# Patient Record
Sex: FEMALE | URBAN - METROPOLITAN AREA
[De-identification: names, ages, dates, MRNs, and addresses within clinical notes are randomized per-mention and may not be internally consistent; named-entity substitution may affect disease eponyms.]

---

## 2017-06-10 ENCOUNTER — TRANSFERRED RECORDS (OUTPATIENT)
Dept: HEALTH INFORMATION MANAGEMENT | Facility: CLINIC | Age: 32
End: 2017-06-10

## 2017-08-22 ENCOUNTER — CARE COORDINATION (OUTPATIENT)
Dept: OTOLARYNGOLOGY | Facility: CLINIC | Age: 32
End: 2017-08-22

## 2017-08-22 NOTE — PROGRESS NOTES
The following message was received from the call center.  Nursing response is also listed below.        ----- Message -----     From: JuniorLeila     Sent: 8/22/2017   8:47 AM       To: Ent Triage-  Subject: Future Ramos pt-pt requesting advice for sym*    Good morning team,    Pt calling in to schedule for sinus pain, major congestion, and allergies. She requested to see someone in ENT. I scheduled her with Ramos, who was in network for her, on 10/11. Pt states she is stressed due to her symptoms, and has not been able to sleep. She requests that someone f/u with her, she is wanting to get advice on her situation. Please contact pt when available.    Thank you!    Leila    Please DO NOT send this message and/or reply back to sender. Call Center Representatives DO NOT respond to messages.    Hi Leila,    We have discussed this issue in the past. As a general rule of thumb, we are not calling out to patients that haven't been seen here to give advice.  This patient in particular does not have anything established in the system (meds/history etc).  It wouldn't be a ham decision to advise.  She should follow up with Atiya in the meantime if symptomatic.    Thanks,  Fatuma Montgomery R.N., B.S.N.  Nurse Coordinator Head & Neck Surgery  [phone]  8/22/2017 11:18 AM

## 2017-08-24 ENCOUNTER — OFFICE VISIT (OUTPATIENT)
Dept: INTERNAL MEDICINE | Facility: CLINIC | Age: 32
End: 2017-08-24

## 2017-08-24 VITALS
HEART RATE: 66 BPM | BODY MASS INDEX: 19.54 KG/M2 | WEIGHT: 110.3 LBS | DIASTOLIC BLOOD PRESSURE: 59 MMHG | TEMPERATURE: 97.6 F | HEIGHT: 63 IN | OXYGEN SATURATION: 100 % | SYSTOLIC BLOOD PRESSURE: 95 MMHG

## 2017-08-24 DIAGNOSIS — J01.01 ACUTE RECURRENT MAXILLARY SINUSITIS: Primary | ICD-10-CM

## 2017-08-24 RX ORDER — LORATADINE 10 MG/1
10 CAPSULE, LIQUID FILLED ORAL DAILY
Qty: 30 CAPSULE | Refills: 3 | Status: ON HOLD | OUTPATIENT
Start: 2017-08-24 | End: 2018-04-14

## 2017-08-24 ASSESSMENT — PAIN SCALES - GENERAL: PAINLEVEL: SEVERE PAIN (7)

## 2017-08-24 NOTE — PROGRESS NOTES
PRIMARY CARE CENTER         HPI:       Cathie Noonan is a 32 year old female who presents for the following   Patient presents with: URI (Patient here for ongoing cold for about 4 weeks.)  Patient has a chronic history involving chronic sinusitis and allergic rhinitis since the age of 19. Per patient her current episode with sinusitis started on July the 29th. Presentation started with clear runny nose, sore throat, sinus congestion. Patient started taking azithromycin which she had from [place]. After finishing the 5 days course of azithromycin her symptoms improved then relapsed again. Patient was seen then by a nurse practitioner who prescribed doxycycline. Patient finished her course of doxycycline symptoms improved then relapsed again. Now she is complaining of maxillary sinus fullness, nasal congestion, yellow mucos discharge, and post nasal drip. She is currently denying fever, or chills but she has had these symptoms earlier this month when she started doxycycline and azithromycin. She is used to sinusitis episodes around summer which she attributed to tree allergy and other episodes around monsoon season back in [place] which she attributed to cold wind exposure. Last sinusitis she had was back in February 2015. Her symptoms used to always improve with amoxicillin/clauvonic acid.  Patient noted that she was not prescribed augmentin because the nurse practitioner learnt that her identical sister has penicillin allergy. However patient confirmed that she never had any kind of allergy with penicillin.     Patient also started taking benadryl since her symptoms started late last month. She also tried steam and pot which seem to worsen her congestion.   Patient endorses maxillary pressure mostly on the left side. Which is consistent with all of her previous sinusitis presentation.   She is feeling groggy because she hasn't been able to sleep properly for the past 3 weeks. She also has sore throat in the  "mornings likely due to mouth breathing.    She had an MRI done in 2014 when [place] was [place] that per patient showed \"inflammation of the sinuses and no obstruction was noted on that imaging'        Problem, Medication and Allergy Lists were   reviewed and are current.   There are no active problems to display for this patient.        Current Outpatient Prescriptions   Medication Sig Dispense Refill     DOXYCYCLINE PO Take 100 mg by mouth       amoxicillin-clavulanate (AUGMENTIN) 875-125 MG per tablet Take 1 tablet by mouth 2 times daily 20 tablet 0     loratadine (CLARITIN) 10 MG capsule Take 10 mg by mouth daily 30 capsule 3       No Known Allergies   There are no active problems to display for this patient.  ,     Current Outpatient Prescriptions   Medication Sig Dispense Refill     DOXYCYCLINE PO Take 100 mg by mouth       amoxicillin-clavulanate (AUGMENTIN) 875-125 MG per tablet Take 1 tablet by mouth 2 times daily 20 tablet 0     loratadine (CLARITIN) 10 MG capsule Take 10 mg by mouth daily 30 capsule 3   ,   No Known Allergies     Family history is noncontributory          Review of Systems:   ROS  I have personally reviewed and updated the complete ROS on the day of the visit.           Physical Exam:   BP 95/59  Pulse 66  Temp 97.6  F (36.4  C) (Oral)  Ht 1.59 m (5' 2.6\")  Wt 50 kg (110 lb 4.8 oz)  LMP 08/12/2017  SpO2 100%  Breastfeeding? No  BMI 19.79 kg/m2  Body mass index is 19.79 kg/(m^2).  Vitals were reviewed       GENERAL APPEARANCE: healthy, alert and no distress     EYES: EOMI, PERRL     HENT: ear canals and TM's normal and nose and mouth without ulcers or lesions     NECK: cervical adenopathy, no asymmetry, masses, or scars and thyroid normal to palpation     RESP: lungs clear to auscultation - no rales, rhonchi or wheezes     CV: regular rates and rhythm, normal S1 S2, no S3 or S4 and no murmur, click or rub     MS: extremities normal- no gross deformities noted, no evidence of " inflammation in joints, FROM in all extremities.     SKIN: no suspicious lesions or rashes     NEURO: Normal strength and tone, sensory exam grossly normal, mentation intact and speech normal     PSYCH: mentation appears normal. and affect normal/bright     LYMPHATICS: No axillary, cervical, or supraclavicular nodes        Results:      Results from the last 24 hoursNo results found for this or any previous visit (from the past 24 hour(s)).  Assessment and Plan     #Rhinitis  #Sinusitis    Patient has a history of repeated bacterial sinusitis. Most of these episodes seem to happen following allergies and viral sinusitis. Her symptoms used to resolve with a course of Augmentin. Her sinusitis always seem worse in the left maxillary sinus. Patient is endorsing yellow mucous discharge and post nasal drip. She has failed treatment with doxycycline. Patient is taking benadryl at day and is feeling groggy and is unable to focus in class and is feeling sleepy.     - start 10 days course of augmentin  - claritin once a day  - ENT consult  - CT maxillofacial       Cathie was seen today for uri.    Diagnoses and all orders for this visit:    Acute recurrent maxillary sinusitis  -     amoxicillin-clavulanate (AUGMENTIN) 875-125 MG per tablet; Take 1 tablet by mouth 2 times daily  -     CT Maxillofacial w/o contrast (CT Sinus); Future  -     OTOLARYNGOLOGY REFERRAL  -     loratadine (CLARITIN) 10 MG capsule; Take 10 mg by mouth daily        Options for treatment and follow-up care were reviewed with the patient. Cathie Noonan engaged in the decision making process and verbalized understanding of the options discussed and agreed with the final plan.    Marilyn Corbin MD  Aug 24, 2017    Pt was seen and plan of care discussed with Tamy Reddy MD.  Tamy Reddy MD

## 2017-08-24 NOTE — PATIENT INSTRUCTIONS
Primary Care Center Medication Refill Request Information:  * Please contact your pharmacy regarding ANY request for medication refills.  ** [facility] Prescription Fax = [phone]  * Please allow 3 business days for routine medication refills.  * Please allow 5 business days for controlled substance medication refills.     Primary Care Center Test Result notification information:  *You will be notified with in 7-10 days of your appointment day regarding the results of your test.  If you are on MyChart you will be notified as soon as the provider has reviewed the results and signed off on them.    Primary Care Center [phone]   [facility] [phone]  Radiology (Imaging Center) [phone]

## 2017-08-24 NOTE — MR AVS SNAPSHOT
After Visit Summary   8/24/2017    Cathie Noonan    MRN: [id number]           Patient Information     Date Of Birth          1985        Visit Information        Provider Department      8/24/2017 1:05 PM Marilyn Corbin MD [facility]        Today's Diagnoses     Acute recurrent maxillary sinusitis    -  1      Care Instructions    Primary Care Center Medication Refill Request Information:  * Please contact your pharmacy regarding ANY request for medication refills.  ** [facility] Prescription Fax = [phone]  * Please allow 3 business days for routine medication refills.  * Please allow 5 business days for controlled substance medication refills.     Primary Care Center Test Result notification information:  *You will be notified with in 7-10 days of your appointment day regarding the results of your test.  If you are on MyChart you will be notified as soon as the provider has reviewed the results and signed off on them.    Primary Care Center [phone]   [phone]  Radiology (Imaging Center) [phone]                Follow-ups after your visit        Additional Services     OTOLARYNGOLOGY REFERRAL       Your provider has referred you to: PREFERRED PROVIDERS:    Please be aware that coverage of these services is subject to the terms and limitations of your health insurance plan.  Call member services at your health plan with any benefit or coverage questions.      Please bring the following with you to your appointment:    (1) Any X-Rays, CTs or MRIs which have been performed.  Contact the facility where they were done to arrange for  prior to your scheduled appointment.   (2) List of current medications  (3) This referral request   (4) Any documents/labs given to you for this referral                  Your next 10 appointments already scheduled     Sep 26, 2017  9:00 AM CDT   New Patient Visit with Lynette Mckeon MD   [facility]  ([facility])    [place] [facility] [address]  [address]  [address]  [facility] [address] [phone]            Oct 11, 2017  8:30 AM CDT   (Arrive by 8:15 AM)   New Patient Visit with Trudi Beasley MD   [facility] ([facility] [place])    [address]  [address]th Floor  [facility] [address] [phone]              Future tests that were ordered for you today     Open Future Orders        Priority Expected Expires Ordered    CT Maxillofacial w/o contrast (CT Sinus) Routine  2018            Who to contact     Please call your clinic at [phone] to:    Ask questions about your health    Make or cancel appointments    Discuss your medicines    Learn about your test results    Speak to your doctor   If you have compliments or concerns about an experience at your clinic, or if you wish to file a complaint, please contact [facility] Patient Relations at [phone] or email us at [email or web address]         Additional Information About Your Visit        [employer] Information     [employer] is an electronic gateway that provides easy, online access to your medical records. With [employer], you can request a clinic appointment, read your test results, renew a prescription or communicate with your care team.     To sign up for [employer] visit the website at [email or web address]   You will be asked to enter the access code listed below, as well as some personal information. Please follow the directions to create your username and password.     Your access code is: [email or web address]  Expires: 2017  6:30 AM     Your access code will  in 90 days. If you need help or a new code, please contact your [facility] or call [phone] for assistance.        Care EveryWhere ID     This is your Care EveryWhere ID. This could be used by other organizations to access your  "[facility] medical records  [id number]        Your Vitals Were     Pulse Temperature Height Last Period Pulse Oximetry Breastfeeding?    66 97.6  F (36.4  C) (Oral) 1.59 m (5' 2.6\") 08/12/2017 100% No    BMI (Body Mass Index)                   19.79 kg/m2            Blood Pressure from Last 3 Encounters:   08/24/17 95/59    Weight from Last 3 Encounters:   08/24/17 50 kg (110 lb 4.8 oz)              We Performed the Following     OTOLARYNGOLOGY REFERRAL          Today's Medication Changes          These changes are accurate as of: 8/24/17  2:05 PM.  If you have any questions, ask your nurse or doctor.               Start taking these medicines.        Dose/Directions    amoxicillin-clavulanate 875-125 MG per tablet   Commonly known as:  AUGMENTIN   Used for:  Acute recurrent maxillary sinusitis        Dose:  1 tablet   Take 1 tablet by mouth 2 times daily   Quantity:  20 tablet   Refills:  0       loratadine 10 MG capsule   Commonly known as:  CLARITIN   Used for:  Acute recurrent maxillary sinusitis        Dose:  10 mg   Take 10 mg by mouth daily   Quantity:  30 capsule   Refills:  3            Where to get your medicines      These medications were sent to [facility] [address] [place] [address] [address] [place] [id number]    Hours:  TRANSPLANT PHONE NUMBER [phone] Phone:  [phone]     amoxicillin-clavulanate 875-125 MG per tablet    loratadine 10 MG capsule                Primary Care Provider    No Ref-Primary Verified       No address on file        Equal Access to Services     JOSSELYN LEONARDO AH: Hadii yusra granto Sosue, waaxda luqadaha, qaybta kaalmada adeegyada, waxay fabien olivares. So [facility] [phone].    ATENCIÓN: Si habla español, tiene a holguin disposición servicios gratuitos de asistencia lingüística. Llame al [phone].    We comply with applicable federal civil rights laws and [place] laws. We do not " discriminate on the basis of race, color, national origin, age, disability sex, sexual orientation or gender identity.            Thank you!     Thank you for choosing [facility]  for your care. Our goal is always to provide you with excellent care. Hearing back from our patients is one way we can continue to improve our services. Please take a few minutes to complete the written survey that you may receive in the mail after your visit with us. Thank you!             Your Updated Medication List - Protect others around you: Learn how to safely use, store and throw away your medicines at www.disposemymeds.org.          This list is accurate as of: 8/24/17  2:05 PM.  Always use your most recent med list.                   Brand Name Dispense Instructions for use Diagnosis    amoxicillin-clavulanate 875-125 MG per tablet    AUGMENTIN    20 tablet    Take 1 tablet by mouth 2 times daily    Acute recurrent maxillary sinusitis       DOXYCYCLINE PO      Take 100 mg by mouth        loratadine 10 MG capsule    CLARITIN    30 capsule    Take 10 mg by mouth daily    Acute recurrent maxillary sinusitis

## 2017-08-24 NOTE — NURSING NOTE
Chief Complaint   Patient presents with     URI     Patient here for ongoing cold for about 4 weeks.     Sandra Jacobo LPN at 1:04 PM on 8/24/2017.

## 2017-08-29 ENCOUNTER — TELEPHONE (OUTPATIENT)
Dept: INTERNAL MEDICINE | Facility: CLINIC | Age: 32
End: 2017-08-29

## 2017-08-29 NOTE — TELEPHONE ENCOUNTER
Orders faxed to the given fax #.  Darlene Shaw RN  ------------------------      ----- Message from Duong Cook sent at 8/29/2017 11:01 AM CDT -----  Regarding: PT WOULD LIKE HER CT SENT TO A DIFFERENT CLINIC/LAB  Contact: [phone]  PT called and states she would like her CT scan orders to be sent to a different clinic/lab, so she could get it done there. PT states it would be closer to where she lives.    PT would like it sent to:  Fax #: [phone].      For any questions or concerns, PT can be reached at:  [phone].      Thank you!  Mian  Call Center

## 2017-09-06 ENCOUNTER — TRANSFERRED RECORDS (OUTPATIENT)
Dept: HEALTH INFORMATION MANAGEMENT | Facility: CLINIC | Age: 32
End: 2017-09-06

## 2017-10-06 ENCOUNTER — TRANSFERRED RECORDS (OUTPATIENT)
Dept: HEALTH INFORMATION MANAGEMENT | Facility: CLINIC | Age: 32
End: 2017-10-06

## 2017-10-07 ASSESSMENT — ANXIETY QUESTIONNAIRES
5. BEING SO RESTLESS THAT IT IS HARD TO SIT STILL: NOT AT ALL
GAD7 TOTAL SCORE: 4
2. NOT BEING ABLE TO STOP OR CONTROL WORRYING: SEVERAL DAYS
4. TROUBLE RELAXING: SEVERAL DAYS
1. FEELING NERVOUS, ANXIOUS, OR ON EDGE: NOT AT ALL
GAD7 TOTAL SCORE: 4
7. FEELING AFRAID AS IF SOMETHING AWFUL MIGHT HAPPEN: NOT AT ALL
GAD7 TOTAL SCORE: 4
7. FEELING AFRAID AS IF SOMETHING AWFUL MIGHT HAPPEN: NOT AT ALL
3. WORRYING TOO MUCH ABOUT DIFFERENT THINGS: SEVERAL DAYS
6. BECOMING EASILY ANNOYED OR IRRITABLE: SEVERAL DAYS

## 2017-10-07 ASSESSMENT — ENCOUNTER SYMPTOMS
ALTERED TEMPERATURE REGULATION: 0
INCREASED ENERGY: 0
PANIC: 0
FATIGUE: 0
CHILLS: 0
WEIGHT LOSS: 0
NERVOUS/ANXIOUS: 1
FEVER: 0
DECREASED CONCENTRATION: 1
DECREASED APPETITE: 0
HALLUCINATIONS: 0
DEPRESSION: 0
POLYPHAGIA: 0
NIGHT SWEATS: 0
POLYDIPSIA: 0
WEIGHT GAIN: 0
INSOMNIA: 0

## 2017-10-08 ASSESSMENT — ANXIETY QUESTIONNAIRES: GAD7 TOTAL SCORE: 4

## 2017-10-10 ENCOUNTER — OFFICE VISIT (OUTPATIENT)
Dept: OBGYN | Facility: CLINIC | Age: 32
End: 2017-10-10
Attending: OBSTETRICS & GYNECOLOGY
Payer: COMMERCIAL

## 2017-10-10 VITALS
SYSTOLIC BLOOD PRESSURE: 102 MMHG | HEIGHT: 63 IN | HEART RATE: 79 BPM | WEIGHT: 111.9 LBS | DIASTOLIC BLOOD PRESSURE: 69 MMHG | BODY MASS INDEX: 19.83 KG/M2

## 2017-10-10 DIAGNOSIS — E28.2 PCOS (POLYCYSTIC OVARIAN SYNDROME): Primary | ICD-10-CM

## 2017-10-10 DIAGNOSIS — Z31.69 ENCOUNTER FOR PRECONCEPTION CONSULTATION: ICD-10-CM

## 2017-10-10 PROCEDURE — 99212 OFFICE O/P EST SF 10 MIN: CPT | Mod: ZF

## 2017-10-10 RX ORDER — VITAMIN A, VITAMIN C, VITAMIN D-3, VITAMIN E, VITAMIN B-1, VITAMIN B-2, NIACIN, VITAMIN B-6, CALCIUM, IRON, ZINC, COPPER 4000; 120; 400; 22; 1.84; 3; 20; 10; 1; 12; 200; 27; 25; 2 [IU]/1; MG/1; [IU]/1; MG/1; MG/1; MG/1; MG/1; MG/1; MG/1; UG/1; MG/1; MG/1; MG/1; MG/1
1 TABLET ORAL DAILY
Qty: 90 TABLET | Refills: 3 | Status: SHIPPED | OUTPATIENT
Start: 2017-10-10 | End: 2018-06-29

## 2017-10-10 NOTE — LETTER
"10/10/2017       RE: Cathie Noonan  [address]  [address]  [place] [address]     Dear Colleague,    Thank you for referring your patient, Cathie Noonan, to the [facility] at [facility]. Please see a copy of my visit note below.    CC: PCOS & preconception counseling    Subjective:  Cathie Noonan is a 32 year old  who presents for PCOS and preconception counseling. Pt reports regular periods with 28 day cycles since menarche at age 15-16. Pt noted irregular periods from February to May with cycle length increasing to 45 days. No intermenstrual spotting. Also had bloating, cramping, and lighter flow than usual during this time. Was evaluated in [place] and was found to have elevated prolactin of 29, Hgb A1c of 6.3, and ultrasound with possible mild polycystic ovaries. She also noticed small amount of facial and chest hair. She was prescribed carbergoline once per week x4 weeks and made lifestyle modifications for her pre-diabetes and lost 5 lbs. Since May has returned to normal 28 day cycles. Periods last 4 days. Uses 3 pads/tampons daily. Continues to have mild left sided cramping during her period. PMS symptoms include breast tenderness and mood changes. Pt recently , planning to try for pregnancy soon. Not currently using contraception.     Past Medical History:   Diagnosis Date     2016     Past Surgical History:   Procedure Laterality Date     NO HISTORY OF SURGERY       Family History   Problem Relation Age of Onset     Coronary Artery Disease Father      Hypertension Father      DIABETES Father      Hypertension Mother      DIABETES Mother        Objective:  /69 (BP Location: Left arm, Patient Position: Chair)  Pulse 79  Ht 1.6 m (5' 3\")  Wt 50.8 kg (111 lb 14.4 oz)  LMP 10/06/2017  Breastfeeding? No  BMI 19.82 kg/m2   General: Well appearing, no acute distress.   Resp: Normal effort on room air  Extremities: No " edema    A/P:   Cathie Noonan is a 32 year old  who presents for PCOS and preconception counseling. Discussed with patient that the best marker of ovulation is regular cycles and that she should be able to conceive with regular intercourse. Recommended tracking cycles and timed intercourse the week before ovulation. Will recheck prolactin and HgbA1c, recommend starting Metformin if still in prediabetic range. Encouraged continued lifestyle modifications.  Also recommended starting PNV, prescription provided for her today. Will notify her of the results.     Follow up: As needed. Call with positive pregnancy test.      Aixa Murrieta  Ob/Gyn PGY-1  17 1:08 PM      Appreciate note by Dr. Murrieta. Patient has been seen and examined by me with the resident, agree with above note.     Lynette Mckeon MD    Total visit time was 15 minutes with 15 minutes spent in counseling and coordination of care for pcos .        Again, thank you for allowing me to participate in the care of your patient.      Sincerely,    Lynette Mckeon MD

## 2017-10-10 NOTE — NURSING NOTE
Chief Complaint   Patient presents with     Consult For     Consult PCOS       See NELI Trevizo 10/10/2017

## 2017-10-10 NOTE — PROGRESS NOTES
"CC: PCOS & preconception counseling    Subjective:  Cathie Noonan is a 32 year old  who presents for PCOS and preconception counseling. Pt reports regular periods with 28 day cycles since menarche at age 15-16. Pt noted irregular periods from February to May with cycle length increasing to 45 days. No intermenstrual spotting. Also had bloating, cramping, and lighter flow than usual during this time. Was evaluated in [place] and was found to have elevated prolactin of 29, Hgb A1c of 6.3, and ultrasound with possible mild polycystic ovaries. She also noticed small amount of facial and chest hair. She was prescribed carbergoline once per week x4 weeks and made lifestyle modifications for her pre-diabetes and lost 5 lbs. Since May has returned to normal 28 day cycles. Periods last 4 days. Uses 3 pads/tampons daily. Continues to have mild left sided cramping during her period. PMS symptoms include breast tenderness and mood changes. Pt recently , planning to try for pregnancy soon. Not currently using contraception.     Past Medical History:   Diagnosis Date     Anemia      Past Surgical History:   Procedure Laterality Date     NO HISTORY OF SURGERY       Family History   Problem Relation Age of Onset     Coronary Artery Disease Father      Hypertension Father      DIABETES Father      Hypertension Mother      DIABETES Mother        Objective:  /69 (BP Location: Left arm, Patient Position: Chair)  Pulse 79  Ht 1.6 m (5' 3\")  Wt 50.8 kg (111 lb 14.4 oz)  LMP 10/06/2017  Breastfeeding? No  BMI 19.82 kg/m2   General: Well appearing, no acute distress.   Resp: Normal effort on room air  Extremities: No edema    A/P:   Cathie Noonan is a 32 year old  who presents for PCOS and preconception counseling. Discussed with patient that the best marker of ovulation is regular cycles and that she should be able to conceive with regular intercourse. Recommended tracking cycles and timed intercourse " the week before ovulation. Will recheck prolactin and HgbA1c, recommend starting Metformin if still in prediabetic range. Encouraged continued lifestyle modifications.  Also recommended starting PNV, prescription provided for her today. Will notify her of the results.     Follow up: As needed. Call with positive pregnancy test.      Aixa Murrieta  Ob/Gyn PGY-1  09/05/17 1:08 PM      Appreciate note by Dr. Murrieta. Patient has been seen and examined by me with the resident, agree with above note.     Lynette Mckeon MD    Total visit time was 15 minutes with 15 minutes spent in counseling and coordination of care for pcos .          Answers for HPI/ROS submitted by the patient on 10/7/2017   ANGEL 7 TOTAL SCORE: 4  PHQ-2 Score: 1  General Symptoms: Yes  Skin Symptoms: No  HENT Symptoms: No  EYE SYMPTOMS: No  HEART SYMPTOMS: No  LUNG SYMPTOMS: No  INTESTINAL SYMPTOMS: No  URINARY SYMPTOMS: No  GYNECOLOGIC SYMPTOMS: No  BREAST SYMPTOMS: No  SKELETAL SYMPTOMS: No  BLOOD SYMPTOMS: No  NERVOUS SYSTEM SYMPTOMS: No  MENTAL HEALTH SYMPTOMS: Yes  Fever: No  Loss of appetite: No  Weight loss: No  Weight gain: No  Fatigue: No  Night sweats: No  Chills: No  Increased stress: Yes  Excessive hunger: No  Excessive thirst: No  Feeling hot or cold when others believe the temperature is normal: No  Loss of height: No  Post-operative complications: No  Surgical site pain: No  Hallucinations: No  Change in or Loss of Energy: No  Hyperactivity: No  Confusion: No  Nervous or Anxious: Yes  Depression: No  Trouble sleeping: No  Trouble thinking or concentrating: Yes  Mood changes: Yes  Panic attacks: No

## 2017-10-10 NOTE — MR AVS SNAPSHOT
"              After Visit Summary   10/10/2017    Cathie Noonan    MRN: [id number]           Patient Information     Date Of Birth          1985        Visit Information        Provider Department      10/10/2017 8:15 AM Lynette Mckeon MD [facility]        Today's Diagnoses     PCOS (polycystic ovarian syndrome)    -  1    Encounter for preconception consultation           Follow-ups after your visit        Who to contact     Please call your clinic at [phone] to:    Ask questions about your health    Make or cancel appointments    Discuss your medicines    Learn about your test results    Speak to your doctor   If you have compliments or concerns about an experience at your clinic, or if you wish to file a complaint, please contact [facility] Patient Relations at [phone] or email us at [email or web address]         Additional Information About Your Visit        MyChart Information     [employer] gives you secure access to your electronic health record. If you see a primary care provider, you can also send messages to your care team and make appointments. If you have questions, please call your primary care clinic.  If you do not have a primary care provider, please call [phone] and they will assist you.      K[employer] is an electronic gateway that provides easy, online access to your medical records. With [employer], you can request a clinic appointment, read your test results, renew a prescription or communicate with your care team.     To access your existing account, please contact your [facility] or call [phone] for assistance.        Care EveryWhere ID     This is your Care EveryWhere ID. This could be used by other organizations to access your [place] medical records  [id number]        Your Vitals Were     Pulse Height Last Period Breastfeeding? BMI (Body Mass Index)       79 1.6 m (5' 3\") " 10/06/2017 No 19.82 kg/m2        Blood Pressure from Last 3 Encounters:   10/10/17 102/69   08/24/17 95/59    Weight from Last 3 Encounters:   10/10/17 50.8 kg (111 lb 14.4 oz)   08/24/17 50 kg (110 lb 4.8 oz)                 Today's Medication Changes          These changes are accurate as of: 10/10/17 11:59 PM.  If you have any questions, ask your nurse or doctor.               Start taking these medicines.        Dose/Directions    PRENATAL VITAMIN PLUS LOW IRON 27-1 MG Tabs   Used for:  Encounter for preconception consultation   Started by:  Lynette Mckeon MD        Dose:  1 tablet   Take 1 tablet by mouth daily   Quantity:  90 tablet   Refills:  3            Where to get your medicines      These medications were sent to [facility] [address] [facility] [address]  [address] [place] [address] [address] [place] [id number]    Hours:  TRANSPLANT PHONE NUMBER [phone] Phone:  [phone]     PRENATAL VITAMIN PLUS LOW IRON 27-1 MG Tabs                Primary Care Provider    None Specified       No primary provider on file.        Equal Access to Services     KE LEONARDO : Hadreynold Dela Cruz, rosa schmid, heriberto goldberg. So [facility] [phone].    ATENCIÓN: Si habla español, tiene a holguin disposición servicios gratuitos de asistencia lingüística. Llame al [phone].    We comply with applicable federal civil rights laws and [place] laws. We do not discriminate on the basis of race, color, national origin, age, disability, sex, sexual orientation, or gender identity.            Thank you!     Thank you for choosing [facility]  for your care. Our goal is always to provide you with excellent care. Hearing back from our patients is one way we can continue to improve our services. Please take a few minutes to complete the written survey that you may receive in the mail after your visit  with us. Thank you!             Your Updated Medication List - Protect others around you: Learn how to safely use, store and throw away your medicines at www.disposemymeds.org.          This list is accurate as of: 10/10/17 11:59 PM.  Always use your most recent med list.                   Brand Name Dispense Instructions for use Diagnosis    IBUPROFEN PO      Take by mouth every 6 hours as needed for moderate pain    PCOS (polycystic ovarian syndrome)       loratadine 10 MG capsule    CLARITIN    30 capsule    Take 10 mg by mouth daily    Acute recurrent maxillary sinusitis       PRENATAL VITAMIN PLUS LOW IRON 27-1 MG Tabs     90 tablet    Take 1 tablet by mouth daily    Encounter for preconception consultation

## 2017-10-12 ENCOUNTER — TELEPHONE (OUTPATIENT)
Dept: OBGYN | Facility: CLINIC | Age: 32
End: 2017-10-12

## 2017-10-12 NOTE — TELEPHONE ENCOUNTER
Patient wanting to do labs at [facility]. Nurse faxed lab orders to them at [phone]. Asked [facility] to fax back results.

## 2018-01-07 ENCOUNTER — HEALTH MAINTENANCE LETTER (OUTPATIENT)
Age: 33
End: 2018-01-07

## 2018-04-13 ENCOUNTER — APPOINTMENT (OUTPATIENT)
Dept: ULTRASOUND IMAGING | Facility: CLINIC | Age: 33
DRG: 759 | End: 2018-04-13
Attending: INTERNAL MEDICINE
Payer: COMMERCIAL

## 2018-04-13 ENCOUNTER — APPOINTMENT (OUTPATIENT)
Dept: CT IMAGING | Facility: CLINIC | Age: 33
DRG: 759 | End: 2018-04-13
Attending: INTERNAL MEDICINE
Payer: COMMERCIAL

## 2018-04-13 ENCOUNTER — HOSPITAL ENCOUNTER (INPATIENT)
Facility: CLINIC | Age: 33
LOS: 1 days | Discharge: HOME | DRG: 759 | End: 2018-04-15
Attending: INTERNAL MEDICINE | Admitting: OBSTETRICS & GYNECOLOGY
Payer: COMMERCIAL

## 2018-04-13 ENCOUNTER — TRANSFERRED RECORDS (OUTPATIENT)
Dept: HEALTH INFORMATION MANAGEMENT | Facility: CLINIC | Age: 33
End: 2018-04-13

## 2018-04-13 DIAGNOSIS — N70.93 TUBO-OVARIAN ABSCESS: ICD-10-CM

## 2018-04-13 LAB
ANION GAP SERPL CALCULATED.3IONS-SCNC: 9 MMOL/L (ref 3–14)
BASOPHILS # BLD AUTO: 0 10E9/L (ref 0–0.2)
BASOPHILS NFR BLD AUTO: 0.2 %
BUN SERPL-MCNC: 9 MG/DL (ref 7–30)
CALCIUM SERPL-MCNC: 9.6 MG/DL (ref 8.5–10.1)
CHLORIDE SERPL-SCNC: 104 MMOL/L (ref 94–109)
CO2 SERPL-SCNC: 24 MMOL/L (ref 20–32)
CREAT SERPL-MCNC: 0.65 MG/DL (ref 0.52–1.04)
CRP SERPL-MCNC: 5.4 MG/L (ref 0–8)
DIFFERENTIAL METHOD BLD: ABNORMAL
EOSINOPHIL # BLD AUTO: 0.1 10E9/L (ref 0–0.7)
EOSINOPHIL NFR BLD AUTO: 0.6 %
ERYTHROCYTE [DISTWIDTH] IN BLOOD BY AUTOMATED COUNT: 12.4 % (ref 10–15)
GFR SERPL CREATININE-BSD FRML MDRD: >90 ML/MIN/1.7M2
GLUCOSE SERPL-MCNC: 92 MG/DL (ref 70–99)
HCG SERPL QL: NEGATIVE
HCT VFR BLD AUTO: 39.4 % (ref 35–47)
HGB BLD-MCNC: 12.9 G/DL (ref 11.7–15.7)
IMM GRANULOCYTES # BLD: 0 10E9/L (ref 0–0.4)
IMM GRANULOCYTES NFR BLD: 0.2 %
LYMPHOCYTES # BLD AUTO: 1.8 10E9/L (ref 0.8–5.3)
LYMPHOCYTES NFR BLD AUTO: 9.8 %
MCH RBC QN AUTO: 27.7 PG (ref 26.5–33)
MCHC RBC AUTO-ENTMCNC: 32.7 G/DL (ref 31.5–36.5)
MCV RBC AUTO: 85 FL (ref 78–100)
MONOCYTES # BLD AUTO: 1 10E9/L (ref 0–1.3)
MONOCYTES NFR BLD AUTO: 5.6 %
NEUTROPHILS # BLD AUTO: 15.2 10E9/L (ref 1.6–8.3)
NEUTROPHILS NFR BLD AUTO: 83.6 %
NRBC # BLD AUTO: 0 10*3/UL
NRBC BLD AUTO-RTO: 0 /100
PLATELET # BLD AUTO: 285 10E9/L (ref 150–450)
POTASSIUM SERPL-SCNC: 3.9 MMOL/L (ref 3.4–5.3)
RBC # BLD AUTO: 4.66 10E12/L (ref 3.8–5.2)
SODIUM SERPL-SCNC: 137 MMOL/L (ref 133–144)
WBC # BLD AUTO: 18.2 10E9/L (ref 4–11)

## 2018-04-13 PROCEDURE — 96375 TX/PRO/DX INJ NEW DRUG ADDON: CPT

## 2018-04-13 PROCEDURE — 86140 C-REACTIVE PROTEIN: CPT | Performed by: INTERNAL MEDICINE

## 2018-04-13 PROCEDURE — 99285 EMERGENCY DEPT VISIT HI MDM: CPT | Mod: Z6 | Performed by: INTERNAL MEDICINE

## 2018-04-13 PROCEDURE — 87491 CHLMYD TRACH DNA AMP PROBE: CPT | Performed by: STUDENT IN AN ORGANIZED HEALTH CARE EDUCATION/TRAINING PROGRAM

## 2018-04-13 PROCEDURE — 87591 N.GONORRHOEAE DNA AMP PROB: CPT | Performed by: STUDENT IN AN ORGANIZED HEALTH CARE EDUCATION/TRAINING PROGRAM

## 2018-04-13 PROCEDURE — 25000128 H RX IP 250 OP 636: Performed by: INTERNAL MEDICINE

## 2018-04-13 PROCEDURE — 87040 BLOOD CULTURE FOR BACTERIA: CPT | Performed by: INTERNAL MEDICINE

## 2018-04-13 PROCEDURE — 85025 COMPLETE CBC W/AUTO DIFF WBC: CPT | Performed by: INTERNAL MEDICINE

## 2018-04-13 PROCEDURE — 25000125 ZZHC RX 250: Performed by: STUDENT IN AN ORGANIZED HEALTH CARE EDUCATION/TRAINING PROGRAM

## 2018-04-13 PROCEDURE — 80048 BASIC METABOLIC PNL TOTAL CA: CPT | Performed by: INTERNAL MEDICINE

## 2018-04-13 PROCEDURE — 84703 CHORIONIC GONADOTROPIN ASSAY: CPT | Performed by: INTERNAL MEDICINE

## 2018-04-13 PROCEDURE — 76856 US EXAM PELVIC COMPLETE: CPT

## 2018-04-13 PROCEDURE — 25000128 H RX IP 250 OP 636: Performed by: STUDENT IN AN ORGANIZED HEALTH CARE EDUCATION/TRAINING PROGRAM

## 2018-04-13 PROCEDURE — 96365 THER/PROPH/DIAG IV INF INIT: CPT

## 2018-04-13 PROCEDURE — 86780 TREPONEMA PALLIDUM: CPT | Performed by: INTERNAL MEDICINE

## 2018-04-13 PROCEDURE — 99285 EMERGENCY DEPT VISIT HI MDM: CPT | Mod: 25

## 2018-04-13 PROCEDURE — 96361 HYDRATE IV INFUSION ADD-ON: CPT

## 2018-04-13 PROCEDURE — 74177 CT ABD & PELVIS W/CONTRAST: CPT

## 2018-04-13 PROCEDURE — 96367 TX/PROPH/DG ADDL SEQ IV INF: CPT

## 2018-04-13 RX ORDER — CEFOTETAN DISODIUM 1 G/10ML
1 INJECTION, POWDER, FOR SOLUTION INTRAMUSCULAR; INTRAVENOUS EVERY 12 HOURS
Status: DISCONTINUED | OUTPATIENT
Start: 2018-04-13 | End: 2018-04-13

## 2018-04-13 RX ORDER — SODIUM CHLORIDE 9 MG/ML
1000 INJECTION, SOLUTION INTRAVENOUS CONTINUOUS
Status: DISCONTINUED | OUTPATIENT
Start: 2018-04-13 | End: 2018-04-14

## 2018-04-13 RX ORDER — IOPAMIDOL 755 MG/ML
73 INJECTION, SOLUTION INTRAVASCULAR ONCE
Status: COMPLETED | OUTPATIENT
Start: 2018-04-13 | End: 2018-04-13

## 2018-04-13 RX ORDER — MULTIPLE VITAMINS W/ MINERALS TAB 9MG-400MCG
1 TAB ORAL DAILY
Status: ON HOLD | COMMUNITY
End: 2018-04-14

## 2018-04-13 RX ORDER — DOXYCYCLINE 100 MG/1
100 CAPSULE ORAL ONCE
Status: COMPLETED | OUTPATIENT
Start: 2018-04-13 | End: 2018-04-14

## 2018-04-13 RX ORDER — KETOROLAC TROMETHAMINE 15 MG/ML
15 INJECTION, SOLUTION INTRAMUSCULAR; INTRAVENOUS ONCE
Status: COMPLETED | OUTPATIENT
Start: 2018-04-13 | End: 2018-04-13

## 2018-04-13 RX ORDER — CEFOTETAN DISODIUM 2 G/20ML
2 INJECTION, POWDER, FOR SOLUTION INTRAMUSCULAR; INTRAVENOUS EVERY 12 HOURS
Status: DISCONTINUED | OUTPATIENT
Start: 2018-04-14 | End: 2018-04-14

## 2018-04-13 RX ADMIN — SODIUM CHLORIDE 1000 ML: 9 INJECTION, SOLUTION INTRAVENOUS at 18:33

## 2018-04-13 RX ADMIN — IOPAMIDOL 73 ML: 755 INJECTION, SOLUTION INTRAVENOUS at 20:18

## 2018-04-13 RX ADMIN — SODIUM CHLORIDE, PRESERVATIVE FREE 68 ML: 5 INJECTION INTRAVENOUS at 20:18

## 2018-04-13 RX ADMIN — KETOROLAC TROMETHAMINE 15 MG: 15 INJECTION, SOLUTION INTRAMUSCULAR; INTRAVENOUS at 19:46

## 2018-04-13 ASSESSMENT — ENCOUNTER SYMPTOMS
BACK PAIN: 0
VOMITING: 0
DIARRHEA: 0
SHORTNESS OF BREATH: 0
DYSURIA: 0
ABDOMINAL PAIN: 1
ADENOPATHY: 0
FEVER: 0
LIGHT-HEADEDNESS: 0
HEADACHES: 0
WEAKNESS: 0
FLANK PAIN: 1
WHEEZING: 0
COUGH: 0
CHILLS: 0
DIFFICULTY URINATING: 0
NUMBNESS: 0
CONFUSION: 0
NAUSEA: 0
NECK PAIN: 0

## 2018-04-13 NOTE — ED NOTES
Bed: ED06  Expected date:   Expected time:   Means of arrival:   Comments:  Hussain  32F  Abdominal pain  Wbc elevated

## 2018-04-13 NOTE — LETTER
4/15/2018         Cathie Noonan   [address]  [address]  [place] [address]        Dear Ms. Noonan:    The results of your recent Gonorrhea and chlamydia were negative.    If you have any questions or concerns, please call the clinic at [phone].      Sincerely,      Negra Sharma MD

## 2018-04-13 NOTE — IP AVS SNAPSHOT
[facility] [address]    [address]    [place] [address]    Phone:  [phone]                                       After Visit Summary   4/13/2018    Cathie Noonan    MRN: [id number]           After Visit Summary Signature Page     I have received my discharge instructions, and my questions have been answered. I have discussed any challenges I see with this plan with the nurse or doctor.    ..........................................................................................................................................  Patient/Patient Representative Signature      ..........................................................................................................................................  Patient Representative Print Name and Relationship to Patient    ..................................................               ................................................  Date                                            Time    ..........................................................................................................................................  Reviewed by Signature/Title    ...................................................              ..............................................  Date                                                            Time

## 2018-04-13 NOTE — ED PROVIDER NOTES
History     Chief Complaint   Patient presents with     Abdominal Pain     HPI  Cathie Noonan is a 32 year old female with a history of PCOS who presents with abdominal pain. The patient complains of sudden onset right lower quadrant pain that started around 2:30 PM. The pain radiates to her back. She denies any dysuria, difficulty urinating, nausea, vomiting or diarrhea. She has been having normal bowel movements. Her last menstrual period was last month, though she reports she has been experiencing irregular and delayed periods. She denies any cough, shortness of breath or chest pain currently; reports she had a transient episode of chest pain and breathlessness yesterday. She also denies any history of abdominal surgeries. She reports a history of anemia, otherwise denies any chronic medical problems.     She was seen this afternoon at [facility] for the pain. WBC was elevated to 21,000, UA normal, UPT negative. Pelvic exam reportedly unremarkable with tenderness in the RQL more than in the adnexa.    PAST MEDICAL HISTORY:   Past Medical History:   Diagnosis Date     Anemia 2016       PAST SURGICAL HISTORY:   Past Surgical History:   Procedure Laterality Date     NO HISTORY OF SURGERY         FAMILY HISTORY:   Family History   Problem Relation Age of Onset     Coronary Artery Disease Father      Hypertension Father      DIABETES Father      Hypertension Mother      DIABETES Mother        SOCIAL HISTORY:   Social History   Substance Use Topics     Smoking status: Never Smoker     Smokeless tobacco: Never Used     Alcohol use No     Current Facility-Administered Medications   Medication     HYDROmorphone (PF) (DILAUDID) injection 0.5 mg     lactated ringers infusion     ertapenem (INVanz) 1 g vial to attach to  mL bag     methylPREDNISolone sodium succinate (solu-MEDROL) 125 mg/2 mL injection     sodium chloride 0.9% infusion     doxycycline (VIBRAMYCIN) capsule 100 mg     cefoTEtan (CEFOTAN) 2 g vial to attach  "to  ml bag     Current Outpatient Prescriptions   Medication     multivitamin, therapeutic with minerals (MULTI-VITAMIN) TABS tablet     Prenatal Vit-Fe Fumarate-FA (PRENATAL VITAMIN PLUS LOW IRON) 27-1 MG TABS     IBUPROFEN PO     loratadine (CLARITIN) 10 MG capsule      No Known Allergies     I have reviewed the Medications, Allergies, Past Medical and Surgical History, and Social History in the Epic system.    Review of Systems   Constitutional: Negative for chills and fever.   HENT: Negative for congestion.    Eyes: Negative for visual disturbance.   Respiratory: Negative for cough, shortness of breath and wheezing.    Cardiovascular: Negative for chest pain and leg swelling.   Gastrointestinal: Positive for abdominal pain (RLQ). Negative for diarrhea, nausea and vomiting.   Genitourinary: Positive for flank pain (right) and menstrual problem (LNMP March 10). Negative for difficulty urinating, dysuria, urgency and vaginal bleeding.   Musculoskeletal: Negative for back pain and neck pain.   Skin: Negative for rash.   Neurological: Negative for weakness, light-headedness, numbness and headaches.   Hematological: Negative for adenopathy.   Psychiatric/Behavioral: Negative for confusion.   All other systems reviewed and are negative.      Physical Exam   BP: 108/69  Pulse: 98  Heart Rate: 103  Temp: 98.2  F (36.8  C)  Resp: 16  Height: 160 cm (5' 2.99\")  Weight: 53.5 kg (118 lb)  SpO2: 98 %      Physical Exam   Constitutional: She is oriented to person, place, and time. She appears well-developed and well-nourished. No distress.   HENT:   Head: Normocephalic and atraumatic.   Right Ear: External ear normal.   Left Ear: External ear normal.   Nose: Nose normal.   Mouth/Throat: Oropharynx is clear and moist. No oropharyngeal exudate.   Eyes: EOM are normal. Pupils are equal, round, and reactive to light. No scleral icterus.   Neck: Normal range of motion. Neck supple. No JVD present.   Cardiovascular: Normal " rate, regular rhythm and normal heart sounds.  Exam reveals no friction rub.    No murmur heard.  Pulmonary/Chest: Effort normal and breath sounds normal. She has no wheezes. She has no rales.   Abdominal: There is tenderness in the suprapubic area. There is CVA tenderness. There is no rebound and no guarding.   Musculoskeletal: She exhibits no edema or tenderness.   Lymphadenopathy:     She has no cervical adenopathy.   Neurological: She is alert and oriented to person, place, and time. No cranial nerve deficit. Coordination normal.   Skin: Skin is warm and dry.   Psychiatric: She has a normal mood and affect. Her behavior is normal.   Nursing note and vitals reviewed.      ED Course     ED Course     Procedures     5:51 PM  The patient was seen and examined by Dr. Hopkins in Room 6.          Labs/Imaging    Results for orders placed or performed during the hospital encounter of 04/13/18 (from the past 24 hour(s))   CBC with platelets differential   Result Value Ref Range    WBC 18.2 (H) 4.0 - 11.0 10e9/L    RBC Count 4.66 3.8 - 5.2 10e12/L    Hemoglobin 12.9 11.7 - 15.7 g/dL    Hematocrit 39.4 35.0 - 47.0 %    MCV 85 78 - 100 fl    MCH 27.7 26.5 - 33.0 pg    MCHC 32.7 31.5 - 36.5 g/dL    RDW 12.4 10.0 - 15.0 %    Platelet Count 285 150 - 450 10e9/L    Diff Method Automated Method     % Neutrophils 83.6 %    % Lymphocytes 9.8 %    % Monocytes 5.6 %    % Eosinophils 0.6 %    % Basophils 0.2 %    % Immature Granulocytes 0.2 %    Nucleated RBCs 0 0 /100    Absolute Neutrophil 15.2 (H) 1.6 - 8.3 10e9/L    Absolute Lymphocytes 1.8 0.8 - 5.3 10e9/L    Absolute Monocytes 1.0 0.0 - 1.3 10e9/L    Absolute Eosinophils 0.1 0.0 - 0.7 10e9/L    Absolute Basophils 0.0 0.0 - 0.2 10e9/L    Abs Immature Granulocytes 0.0 0 - 0.4 10e9/L    Absolute Nucleated RBC 0.0    CRP inflammation   Result Value Ref Range    CRP Inflammation 5.4 0.0 - 8.0 mg/L   Basic metabolic panel   Result Value Ref Range    Sodium 137 133 - 144 mmol/L     Potassium 3.9 3.4 - 5.3 mmol/L    Chloride 104 94 - 109 mmol/L    Carbon Dioxide 24 20 - 32 mmol/L    Anion Gap 9 3 - 14 mmol/L    Glucose 92 70 - 99 mg/dL    Urea Nitrogen 9 7 - 30 mg/dL    Creatinine 0.65 0.52 - 1.04 mg/dL    GFR Estimate >90 >60 mL/min/1.7m2    GFR Estimate If Black >90 >60 mL/min/1.7m2    Calcium 9.6 8.5 - 10.1 mg/dL   HCG qualitative pregnancy (blood)   Result Value Ref Range    HCG Qualitative Serum Negative NEG^Negative   Pelvis US, complete    Narrative    EXAMINATION: US PELVIS COMPLETE WITHOUT TRANSVAGINAL, 4/13/2018 7:36  PM     COMPARISON: None.    HISTORY: Right lower quadrant pain, elevated white blood cell count,  not pregnant by prior pregnancy    TECHNIQUE: The pelvis was scanned in standard fashion with a  transabdominal transducer(s) using both grey scale and color Doppler  techniques.    FINDINGS: Patient declined transvaginal evaluation.    The uterus measures 7.2 x 3.7 x 4.1 cm, and there is no evidence of a  focal fibroid.  The endometrium is within normal limits and measures  10.2 mm. There is no free fluid in the pelvis.    The right ovary measures 4.0 x 3.3 x 3.2 cm. There is normal blood  flow with low resistance waveform. There is a heterogenous lesion  within the right ovary measuring 2.5 x 2.1 x 2.9 cm without  significant internal or peripheral blood flow.    The left ovary measures 4.0 x 2.3 x 4.3 cm. There is normal blood flow  with low resistance waveforms. Anechoic structure within the left  ovary measuring 2.6 x 1.8 x 2.5 cm, likely ovarian follicle.      Impression    IMPRESSION:   1.  Limited exam as patient declined transvaginal portion of  examination.  2.  No evidence for acute abnormality such as ovarian torsion.  3.  Heterogenous lesion in the right ovary measuring 2.9 cm most  compatible with a hemorrhagic cyst.    I have personally reviewed the examination and initial interpretation  and I agree with the findings.    TYRONE CORRALES MD   Blood Culture ONE  site   Result Value Ref Range    Specimen Description Blood Right Arm     Culture Micro PENDING    CT Abdomen Pelvis w Contrast    Narrative    EXAMINATION: CT ABDOMEN PELVIS W CONTRAST, 4/13/2018 8:30 PM    TECHNIQUE:  Helical CT images from the lung bases through the  symphysis pubis were obtained with IV contrast. Contrast dose: 73 mL  Isovue 370.    COMPARISON: Same day limited pelvic ultrasound    HISTORY: RLQ pain; elevated WBC.    FINDINGS:    Abdomen and pelvis: No focal liver lesion or intrahepatic biliary  ductal dilatation. No gallbladder wall thickening, pericholecystic  fluid, or gallstones. The common bile duct measures 4 mm. Pancreas,  spleen, stomach, adrenal glands, and kidneys are normal. No  hydroureter or urinary tract stones. Bilateral rim-enhancing  hypoattenuating lesions within the ovaries largest measuring 3.4 x 2.5  x 3.4 cm on the left. There are 3 separate rim-enhancing  hypoattenuating lesions within the right ovary largest measuring 1.2 x  1.3 x 1.4 cm. Small amount of free fluid within the pelvis. Mild hazy  fat stranding. Uterus appears within normal limits. Mild bladder wall  thickening may be reactive. No significant diverticulosis, paracolonic  fat stranding, or bowel wall thickening. The appendix is normal. The  terminal ileum and remainder of the small bowel are unremarkable. No  mesenteric lymphadenopathy or masses. Major vascular structures  patent. No significant atherosclerotic plaque.     Lung bases: No pneumothorax, pleural effusion, or focal airspace  opacity. Dependent atelectasis in the left lung base.    Bones and soft tissues: No significant inguinal lymphadenopathy. No  suspicious soft tissue mass. No suspicious osseous lesion. No  significant degenerative change of the lumbar spine.      Impression    IMPRESSION:   1. Bilateral rim-enhancing cystic lesions in the ovaries, largest  measuring 3.4 cm on the left with small amount of free pelvic fluid  and hazy  "stranding.  Given the clinical history, these findings are  concerning for tubo-ovarian abscesses.   2. The appendix is normal. No other acute findings within the abdomen.    These findings were discussed with Dr. Hopkins of the emergency  department on 4/13/2018 at 2116 hours by Dr. Soto.    I have personally reviewed the examination and initial interpretation  and I agree with the findings.    TYRONE CORRALES MD   ISTAT gases lactate william POCT   Result Value Ref Range    Ph Venous 7.38 7.32 - 7.43 pH    PCO2 Venous 33 (L) 40 - 50 mm Hg    PO2 Venous 52 (H) 25 - 47 mm Hg    Bicarbonate Venous 20 (L) 21 - 28 mmol/L    O2 Sat Venous 86 %    Lactic Acid 2.0 0.7 - 2.1 mmol/L     She was seen in consultation by OB/Gyn oncology resident on call. They recommend admission for antibiotics for PID/TOA.    0050: Complains of worsened pain similar to initial presentation. Shaking, crying.  /74  Pulse 96  Temp 99.1  F (37.3  C) (Oral)  Resp 16  Ht 1.6 m (5' 2.99\")  Wt 53.5 kg (118 lb)  LMP 03/10/2018  SpO2 100%  BMI 20.91 kg/m2  No rash.  Lungs Clear.  Cardiac: RRR Normal S1 and S2.  Abdomen: Suprapubic tenderness.    Impression:  Worsened pain related to primary underlying process.   No evidence at this point of allergic reaction, drug reaction, Jarish-Herxheimer like syndrome.      0110: Blood pressure now 79 systolic. She has just received dilaudid for pain. Shaking stopped. Will stop cefotetan. Still no rash or urticaria. Lungs clear. Will give solumedrol, fluid flush. Switch ABX to ertapenem once BP stabilizes. Differential includes sepsis, gram negative endotoxin release, Jarish Herxheimer reaction, hypotension as result of narcotic. Transport cancelled. Will stabilize in ED before considering moving her.     Blood pressure promptly improving with fluid flush. Will recheck Hgb, CBC. Observe through 1st dose antibiotic. If Hgb drops or BP remains unstable repeat US to exclude torsion or tubal rupture " may be considered.    Assessments & Plan (with Medical Decision Making)   Impression:  PID with tubo-ovarian abscess.    I have reviewed the nursing notes.    I have reviewed the findings, diagnosis, plan and need for follow up with the patient.    New Prescriptions    No medications on file       Final diagnoses:   Tubo-ovarian abscess     I, Smooth Mott, am serving as a trained medical scribe to document services personally performed by Jamey Hopkins MD, based on the provider's statements to me.   I, Jamey Hopkins MD, was physically present and have reviewed and verified the accuracy of this note documented by Smooth Mott.     4/13/2018   [facility], [place], EMERGENCY DEPARTMENT     Jamey Hopkins MD  04/13/18 2305       Jamey Hopkins MD  04/14/18 0056       Jamey Hopkins MD  04/14/18 0118       Jamey Hopkins MD  04/14/18 0118       Jamey Hopkins MD  04/14/18 0158

## 2018-04-13 NOTE — LETTER
[facility] [address]  [address]  [place] [address]  Phone: [phone]    April 15, 2018        Cathie Noonan  [address]  [address]  [place] [address]          To whom it may concern:    RE: Cathie Noonan has been hospitalized at [facility] from 4/13-4/15/18. Please excuse her as you see fit at this time from related activities. If there are any questions or concerns please contact the [facility] clinic for further discussion.  We appreciate your understanding at this time.    Sincerely,        Ember Moreira MD  OB/GYN Resident, PGY-3  4/15/2018 10:45 AM

## 2018-04-13 NOTE — IP AVS SNAPSHOT
MRN:[id number]                      After Visit Summary   4/13/2018    Cathie Noonan    MRN: [id number]           Thank you!     Thank you for choosing [facility] for your care. Our goal is always to provide you with excellent care. Hearing back from our patients is one way we can continue to improve our services. Please take a few minutes to complete the written survey that you may receive in the mail after you visit with us. Thank you!        Patient Information     Date Of Birth          1985        Designated Caregiver       Most Recent Value    Caregiver    Will someone help with your care after discharge? no      About your hospital stay     You were admitted on:  April 14, 2018 You last received care in the:  [facility]    You were discharged on:  April 15, 2018        Reason for your hospital stay       Management of tubo-ovarian abscess                  Who to Call     For medical emergencies, please call 911.  For non-urgent questions about your medical care, please call your primary care provider or clinic, None          Attending Provider     Provider Specialty    Jamey Hopkins MD Emergency Medicine    [facility], Negra Isaac MD OB/Gyn       Primary Care Provider Fax #    Physician No Ref-Primary [phone]      After Care Instructions     Activity       Your activity upon discharge: activity as tolerated            Diet       Follow this diet upon discharge: Regular            Discharge Instructions       Return for evaluation if febrile (>38C) or begin to have worsening symptoms (Nausea/vomiting, intractable abdominal pain)                  Follow-up Appointments     [facility]ult [facility] Follow-up and recommended labs and tests       Please make a follow-up appointment for two weeks with OBGYN                  Pending Results     Date and Time Order Name Status Description    4/14/2018 0106 Blood culture Preliminary     4/13/2018 2248 Chlamydia trachomatis PCR In  "process     4/13/2018 2248 Neisseria gonorrhoeae PCR In process     4/13/2018 1955 Blood Culture ONE site Preliminary             Admission Information     Date & Time Provider Department Dept. Phone    4/13/2018 Negra Sharma MD [facility] [phone]      Your Vitals Were     Blood Pressure Pulse Temperature Respirations Height Weight    96/52 (BP Location: Right arm) 82 97.5  F (36.4  C) (Oral) 18 1.6 m (5' 2.99\") 53.5 kg (118 lb)    Last Period Pulse Oximetry BMI (Body Mass Index)             03/10/2018 100% 20.91 kg/m2         MyChart Information     [facility] gives you secure access to your electronic health record. If you see a primary care provider, you can also send messages to your care team and make appointments. If you have questions, please call your primary care clinic.  If you do not have a primary care provider, please call [phone] and they will assist you.        Care EveryWhere ID     This is your Care EveryWhere ID. This could be used by other organizations to access your [facility] medical records  [id number]        Equal Access to Services     JOSSELYN LEONARDO AH: Hadii yusra Dela Cruz, rosa schmid, denys flynn, heriberto allen . So [facility] [phone].    ATENCIÓN: Si habla español, tiene a holguin disposición servicios gratuitos de asistencia lingüística. Llame al [phone].    We comply with applicable federal civil rights laws and [place] laws. We do not discriminate on the basis of race, color, national origin, age, disability, sex, sexual orientation, or gender identity.               Review of your medicines      START taking        Dose / Directions    doxycycline 100 MG capsule   Commonly known as:  VIBRAMYCIN   Indication:  Abscess        Dose:  100 mg   Take 1 capsule (100 mg) by mouth every 12 hours for 14 days   Quantity:  28 capsule   Refills:  0       metroNIDAZOLE 500 MG tablet   Commonly known as:  FLAGYL   Indication:  " Abscess        Dose:  500 mg   Take 1 tablet (500 mg) by mouth every 12 hours for 14 days   Quantity:  28 tablet   Refills:  0         CONTINUE these medicines which have NOT CHANGED        Dose / Directions    loratadine 10 MG tablet   Commonly known as:  CLARITIN        Dose:  10 mg   Take 10 mg by mouth daily as needed for allergies   Refills:  0       PRENATAL VITAMIN PLUS LOW IRON 27-1 MG Tabs   Used for:  Encounter for preconception consultation        Dose:  1 tablet   Take 1 tablet by mouth daily   Quantity:  90 tablet   Refills:  3            Where to get your medicines      These medications were sent to [facility] - [place], [place] - [address]  [address] [address]     Phone:  [phone]     doxycycline 100 MG capsule    metroNIDAZOLE 500 MG tablet                Protect others around you: Learn how to safely use, store and throw away your medicines at www.disposemymeds.org.        ANTIBIOTIC INSTRUCTION     You've Been Prescribed an Antibiotic - Now What?  Your healthcare team thinks that you or your loved one might have an infection. Some infections can be treated with antibiotics, which are powerful, life-saving drugs. Like all medications, antibiotics have side effects and should only be used when necessary. There are some important things you should know about your antibiotic treatment.      Your healthcare team may run tests before you start taking an antibiotic.    Your team may take samples (e.g., from your blood, urine or other areas) to run tests to look for bacteria. These test can be important to determine if you need an antibiotic at all and, if you do, which antibiotic will work best.      Within a few days, your healthcare team might change or even stop your antibiotic.    Your team may start you on an antibiotic while they are working to find out what is making you sick.    Your team might change your antibiotic because test results show  that a different antibiotic would be better to treat your infection.    In some cases, once your team has more information, they learn that you do not need an antibiotic at all. They may find out that you don't have an infection, or that the antibiotic you're taking won't work against your infection. For example, an infection caused by a virus can't be treated with antibiotics. Staying on an antibiotic when you don't need it is more likely to be harmful than helpful.      You may experience side effects from your antibiotic.    Like all medications, antibiotics have side effects. Some of these can be serious.    Let you healthcare team know if you have any known allergies when you are admitted to the hospital.    One significant side effect of nearly all antibiotics is the risk of severe and sometimes deadly diarrhea caused by Clostridium difficile (C. Difficile). This occurs when a person takes antibiotics because some good germs are destroyed. Antibiotic use allows C. diificile to take over, putting patients at high risk for this serious infection.    As a patient or caregiver, it is important to understand your or your loved one's antibiotic treatment. It is especially important for caregivers to speak up when patients can't speak for themselves. Here are some important questions to ask your healthcare team.    What infection is this antibiotic treating and how do you know I have that infection?    What side effects might occur from this antibiotic?    How long will I need to take this antibiotic?    Is it safe to take this antibiotic with other medications or supplements (e.g., vitamins) that I am taking?     Are there any special directions I need to know about taking this antibiotic? For example, should I take it with food?    How will I be monitored to know whether my infection is responding to the antibiotic?    What tests may help to make sure the right antibiotic is prescribed for me?      Information  provided by:  www.cdc.gov/getsmart  U.S. Department of Health and Human Services  Centers for disease Control and Prevention  National Center for Emerging and Zoonotic Infectious Diseases  Division of Healthcare Quality Promotion             Medication List: This is a list of all your medications and when to take them. Check marks below indicate your daily home schedule. Keep this list as a reference.      Medications           Morning Afternoon Evening Bedtime As Needed    doxycycline 100 MG capsule   Commonly known as:  VIBRAMYCIN   Take 1 capsule (100 mg) by mouth every 12 hours for 14 days   Last time this was given:  100 mg on 4/15/2018  7:28 AM                                loratadine 10 MG tablet   Commonly known as:  CLARITIN   Take 10 mg by mouth daily as needed for allergies                                metroNIDAZOLE 500 MG tablet   Commonly known as:  FLAGYL   Take 1 tablet (500 mg) by mouth every 12 hours for 14 days   Last time this was given:  250 mg on 4/15/2018  7:28 AM                                PRENATAL VITAMIN PLUS LOW IRON 27-1 MG Tabs   Take 1 tablet by mouth daily

## 2018-04-13 NOTE — ED NOTES
Pt arrives from [facility] for evaluation of appendicitis. Pain on RLQ since 1430. No N/V/D. Pt AOx4.

## 2018-04-14 PROBLEM — N70.93 TUBO-OVARIAN ABSCESS: Status: ACTIVE | Noted: 2018-04-14

## 2018-04-14 LAB
BASOPHILS # BLD AUTO: 0 10E9/L (ref 0–0.2)
BASOPHILS NFR BLD AUTO: 0.1 %
CO2 BLDCOV-SCNC: 20 MMOL/L (ref 21–28)
DIFFERENTIAL METHOD BLD: ABNORMAL
EOSINOPHIL # BLD AUTO: 0 10E9/L (ref 0–0.7)
EOSINOPHIL NFR BLD AUTO: 0.1 %
ERYTHROCYTE [DISTWIDTH] IN BLOOD BY AUTOMATED COUNT: 12.1 % (ref 10–15)
HCT VFR BLD AUTO: 35.1 % (ref 35–47)
HGB BLD-MCNC: 11.3 G/DL (ref 11.7–15.7)
IMM GRANULOCYTES # BLD: 0 10E9/L (ref 0–0.4)
IMM GRANULOCYTES NFR BLD: 0.3 %
LACTATE BLD-SCNC: 2 MMOL/L (ref 0.7–2.1)
LACTATE BLD-SCNC: 2.1 MMOL/L (ref 0.7–2)
LYMPHOCYTES # BLD AUTO: 0.7 10E9/L (ref 0.8–5.3)
LYMPHOCYTES NFR BLD AUTO: 5.6 %
MCH RBC QN AUTO: 27.2 PG (ref 26.5–33)
MCHC RBC AUTO-ENTMCNC: 32.2 G/DL (ref 31.5–36.5)
MCV RBC AUTO: 85 FL (ref 78–100)
MONOCYTES # BLD AUTO: 0.1 10E9/L (ref 0–1.3)
MONOCYTES NFR BLD AUTO: 1.2 %
NEUTROPHILS # BLD AUTO: 11.1 10E9/L (ref 1.6–8.3)
NEUTROPHILS NFR BLD AUTO: 92.7 %
NRBC # BLD AUTO: 0 10*3/UL
NRBC BLD AUTO-RTO: 0 /100
PCO2 BLDV: 33 MM HG (ref 40–50)
PH BLDV: 7.38 PH (ref 7.32–7.43)
PLATELET # BLD AUTO: 231 10E9/L (ref 150–450)
PO2 BLDV: 52 MM HG (ref 25–47)
RBC # BLD AUTO: 4.15 10E12/L (ref 3.8–5.2)
SAO2 % BLDV FROM PO2: 86 %
T PALLIDUM IGG+IGM SER QL: NEGATIVE
WBC # BLD AUTO: 12 10E9/L (ref 4–11)

## 2018-04-14 PROCEDURE — 25000128 H RX IP 250 OP 636: Performed by: STUDENT IN AN ORGANIZED HEALTH CARE EDUCATION/TRAINING PROGRAM

## 2018-04-14 PROCEDURE — 82803 BLOOD GASES ANY COMBINATION: CPT

## 2018-04-14 PROCEDURE — 85025 COMPLETE CBC W/AUTO DIFF WBC: CPT | Performed by: STUDENT IN AN ORGANIZED HEALTH CARE EDUCATION/TRAINING PROGRAM

## 2018-04-14 PROCEDURE — 12000001 ZZH R&B MED SURG/OB UMMC

## 2018-04-14 PROCEDURE — 25000125 ZZHC RX 250: Performed by: INTERNAL MEDICINE

## 2018-04-14 PROCEDURE — 25000132 ZZH RX MED GY IP 250 OP 250 PS 637: Performed by: STUDENT IN AN ORGANIZED HEALTH CARE EDUCATION/TRAINING PROGRAM

## 2018-04-14 PROCEDURE — 25000132 ZZH RX MED GY IP 250 OP 250 PS 637: Performed by: INTERNAL MEDICINE

## 2018-04-14 PROCEDURE — 83605 ASSAY OF LACTIC ACID: CPT

## 2018-04-14 PROCEDURE — 36415 COLL VENOUS BLD VENIPUNCTURE: CPT | Performed by: STUDENT IN AN ORGANIZED HEALTH CARE EDUCATION/TRAINING PROGRAM

## 2018-04-14 PROCEDURE — 25000128 H RX IP 250 OP 636: Performed by: INTERNAL MEDICINE

## 2018-04-14 PROCEDURE — 25000128 H RX IP 250 OP 636: Performed by: OBSTETRICS & GYNECOLOGY

## 2018-04-14 PROCEDURE — 83605 ASSAY OF LACTIC ACID: CPT | Performed by: INTERNAL MEDICINE

## 2018-04-14 PROCEDURE — 87040 BLOOD CULTURE FOR BACTERIA: CPT | Performed by: INTERNAL MEDICINE

## 2018-04-14 PROCEDURE — 25000125 ZZHC RX 250: Performed by: STUDENT IN AN ORGANIZED HEALTH CARE EDUCATION/TRAINING PROGRAM

## 2018-04-14 RX ORDER — ONDANSETRON 2 MG/ML
4 INJECTION INTRAMUSCULAR; INTRAVENOUS EVERY 6 HOURS PRN
Status: DISCONTINUED | OUTPATIENT
Start: 2018-04-14 | End: 2018-04-15 | Stop reason: HOSPADM

## 2018-04-14 RX ORDER — METHYLPREDNISOLONE SODIUM SUCCINATE 125 MG/2ML
40 INJECTION, POWDER, LYOPHILIZED, FOR SOLUTION INTRAMUSCULAR; INTRAVENOUS ONCE
Status: COMPLETED | OUTPATIENT
Start: 2018-04-14 | End: 2018-04-14

## 2018-04-14 RX ORDER — METOCLOPRAMIDE HYDROCHLORIDE 5 MG/ML
10 INJECTION INTRAMUSCULAR; INTRAVENOUS EVERY 6 HOURS PRN
Status: DISCONTINUED | OUTPATIENT
Start: 2018-04-14 | End: 2018-04-15 | Stop reason: HOSPADM

## 2018-04-14 RX ORDER — ERTAPENEM 1 G/1
1 INJECTION, POWDER, LYOPHILIZED, FOR SOLUTION INTRAMUSCULAR; INTRAVENOUS ONCE
Status: COMPLETED | OUTPATIENT
Start: 2018-04-14 | End: 2018-04-14

## 2018-04-14 RX ORDER — BISACODYL 10 MG
10 SUPPOSITORY, RECTAL RECTAL DAILY PRN
Status: DISCONTINUED | OUTPATIENT
Start: 2018-04-14 | End: 2018-04-15 | Stop reason: HOSPADM

## 2018-04-14 RX ORDER — OXYCODONE HYDROCHLORIDE 5 MG/1
5-10 TABLET ORAL EVERY 4 HOURS PRN
Status: DISCONTINUED | OUTPATIENT
Start: 2018-04-14 | End: 2018-04-15 | Stop reason: HOSPADM

## 2018-04-14 RX ORDER — DOCUSATE SODIUM 100 MG/1
100 CAPSULE, LIQUID FILLED ORAL 2 TIMES DAILY
Status: DISCONTINUED | OUTPATIENT
Start: 2018-04-14 | End: 2018-04-15 | Stop reason: HOSPADM

## 2018-04-14 RX ORDER — BISACODYL 5 MG
10 TABLET, DELAYED RELEASE (ENTERIC COATED) ORAL DAILY PRN
Status: DISCONTINUED | OUTPATIENT
Start: 2018-04-14 | End: 2018-04-15 | Stop reason: HOSPADM

## 2018-04-14 RX ORDER — METHYLPREDNISOLONE SODIUM SUCCINATE 125 MG/2ML
INJECTION, POWDER, LYOPHILIZED, FOR SOLUTION INTRAMUSCULAR; INTRAVENOUS
Status: DISCONTINUED
Start: 2018-04-14 | End: 2018-04-14 | Stop reason: HOSPADM

## 2018-04-14 RX ORDER — BISACODYL 5 MG
15 TABLET, DELAYED RELEASE (ENTERIC COATED) ORAL DAILY PRN
Status: DISCONTINUED | OUTPATIENT
Start: 2018-04-14 | End: 2018-04-15 | Stop reason: HOSPADM

## 2018-04-14 RX ORDER — CALCIUM CARBONATE 500 MG/1
1000 TABLET, CHEWABLE ORAL 4 TIMES DAILY PRN
Status: DISCONTINUED | OUTPATIENT
Start: 2018-04-14 | End: 2018-04-15 | Stop reason: HOSPADM

## 2018-04-14 RX ORDER — ONDANSETRON 4 MG/1
4 TABLET, ORALLY DISINTEGRATING ORAL EVERY 6 HOURS PRN
Status: DISCONTINUED | OUTPATIENT
Start: 2018-04-14 | End: 2018-04-15 | Stop reason: HOSPADM

## 2018-04-14 RX ORDER — GENTAMICIN SULFATE 80 MG/100ML
1.5 INJECTION, SOLUTION INTRAVENOUS EVERY 8 HOURS
Status: DISCONTINUED | OUTPATIENT
Start: 2018-04-14 | End: 2018-04-15

## 2018-04-14 RX ORDER — DOXYCYCLINE 100 MG/1
100 CAPSULE ORAL EVERY 12 HOURS SCHEDULED
Status: DISCONTINUED | OUTPATIENT
Start: 2018-04-14 | End: 2018-04-14

## 2018-04-14 RX ORDER — PROCHLORPERAZINE 25 MG
25 SUPPOSITORY, RECTAL RECTAL EVERY 12 HOURS PRN
Status: DISCONTINUED | OUTPATIENT
Start: 2018-04-14 | End: 2018-04-15 | Stop reason: HOSPADM

## 2018-04-14 RX ORDER — ACETAMINOPHEN 325 MG/1
650 TABLET ORAL EVERY 4 HOURS PRN
Status: DISCONTINUED | OUTPATIENT
Start: 2018-04-14 | End: 2018-04-15 | Stop reason: HOSPADM

## 2018-04-14 RX ORDER — SODIUM CHLORIDE, SODIUM LACTATE, POTASSIUM CHLORIDE, CALCIUM CHLORIDE 600; 310; 30; 20 MG/100ML; MG/100ML; MG/100ML; MG/100ML
INJECTION, SOLUTION INTRAVENOUS CONTINUOUS
Status: DISCONTINUED | OUTPATIENT
Start: 2018-04-14 | End: 2018-04-15 | Stop reason: HOSPADM

## 2018-04-14 RX ORDER — BISACODYL 5 MG
5 TABLET, DELAYED RELEASE (ENTERIC COATED) ORAL DAILY PRN
Status: DISCONTINUED | OUTPATIENT
Start: 2018-04-14 | End: 2018-04-15 | Stop reason: HOSPADM

## 2018-04-14 RX ORDER — IBUPROFEN 600 MG/1
600 TABLET, FILM COATED ORAL EVERY 6 HOURS PRN
Status: DISCONTINUED | OUTPATIENT
Start: 2018-04-14 | End: 2018-04-15 | Stop reason: HOSPADM

## 2018-04-14 RX ORDER — NALOXONE HYDROCHLORIDE 0.4 MG/ML
.1-.4 INJECTION, SOLUTION INTRAMUSCULAR; INTRAVENOUS; SUBCUTANEOUS
Status: DISCONTINUED | OUTPATIENT
Start: 2018-04-14 | End: 2018-04-15 | Stop reason: HOSPADM

## 2018-04-14 RX ORDER — SIMETHICONE 80 MG
80 TABLET,CHEWABLE ORAL EVERY 6 HOURS PRN
Status: DISCONTINUED | OUTPATIENT
Start: 2018-04-14 | End: 2018-04-15 | Stop reason: HOSPADM

## 2018-04-14 RX ORDER — LORATADINE 10 MG/1
10 TABLET ORAL DAILY PRN
COMMUNITY

## 2018-04-14 RX ORDER — PROCHLORPERAZINE MALEATE 5 MG
10 TABLET ORAL EVERY 6 HOURS PRN
Status: DISCONTINUED | OUTPATIENT
Start: 2018-04-14 | End: 2018-04-15 | Stop reason: HOSPADM

## 2018-04-14 RX ORDER — METOCLOPRAMIDE 10 MG/1
10 TABLET ORAL EVERY 6 HOURS PRN
Status: DISCONTINUED | OUTPATIENT
Start: 2018-04-14 | End: 2018-04-15 | Stop reason: HOSPADM

## 2018-04-14 RX ORDER — GENTAMICIN SULFATE 100 MG/100ML
2 INJECTION, SOLUTION INTRAVENOUS ONCE
Status: COMPLETED | OUTPATIENT
Start: 2018-04-14 | End: 2018-04-14

## 2018-04-14 RX ORDER — CLINDAMYCIN PHOSPHATE 900 MG/50ML
900 INJECTION, SOLUTION INTRAVENOUS EVERY 8 HOURS
Status: DISCONTINUED | OUTPATIENT
Start: 2018-04-14 | End: 2018-04-15

## 2018-04-14 RX ORDER — HYDROMORPHONE HYDROCHLORIDE 1 MG/ML
0.5 INJECTION, SOLUTION INTRAMUSCULAR; INTRAVENOUS; SUBCUTANEOUS
Status: DISCONTINUED | OUTPATIENT
Start: 2018-04-14 | End: 2018-04-15 | Stop reason: HOSPADM

## 2018-04-14 RX ADMIN — SODIUM CHLORIDE, POTASSIUM CHLORIDE, SODIUM LACTATE AND CALCIUM CHLORIDE: 600; 310; 30; 20 INJECTION, SOLUTION INTRAVENOUS at 04:29

## 2018-04-14 RX ADMIN — DOXYCYCLINE HYCLATE 100 MG: 100 CAPSULE ORAL at 02:15

## 2018-04-14 RX ADMIN — SODIUM CHLORIDE, POTASSIUM CHLORIDE, SODIUM LACTATE AND CALCIUM CHLORIDE: 600; 310; 30; 20 INJECTION, SOLUTION INTRAVENOUS at 10:26

## 2018-04-14 RX ADMIN — ONDANSETRON 4 MG: 2 INJECTION INTRAMUSCULAR; INTRAVENOUS at 03:23

## 2018-04-14 RX ADMIN — ACETAMINOPHEN 650 MG: 325 TABLET ORAL at 09:37

## 2018-04-14 RX ADMIN — CLINDAMYCIN PHOSPHATE 900 MG: 18 INJECTION, SOLUTION INTRAVENOUS at 17:11

## 2018-04-14 RX ADMIN — SODIUM CHLORIDE, POTASSIUM CHLORIDE, SODIUM LACTATE AND CALCIUM CHLORIDE 605 ML: 600; 310; 30; 20 INJECTION, SOLUTION INTRAVENOUS at 02:26

## 2018-04-14 RX ADMIN — SODIUM CHLORIDE, POTASSIUM CHLORIDE, SODIUM LACTATE AND CALCIUM CHLORIDE: 600; 310; 30; 20 INJECTION, SOLUTION INTRAVENOUS at 02:56

## 2018-04-14 RX ADMIN — GENTAMICIN SULFATE 100 MG: 100 INJECTION, SOLUTION INTRAVENOUS at 10:24

## 2018-04-14 RX ADMIN — SODIUM CHLORIDE, POTASSIUM CHLORIDE, SODIUM LACTATE AND CALCIUM CHLORIDE 1000 ML: 600; 310; 30; 20 INJECTION, SOLUTION INTRAVENOUS at 01:15

## 2018-04-14 RX ADMIN — HYDROMORPHONE HYDROCHLORIDE 0.5 MG: 1 INJECTION, SOLUTION INTRAMUSCULAR; INTRAVENOUS; SUBCUTANEOUS at 00:50

## 2018-04-14 RX ADMIN — IBUPROFEN 600 MG: 600 TABLET ORAL at 11:33

## 2018-04-14 RX ADMIN — CEFOTETAN DISODIUM 2 G: 2 INJECTION, POWDER, FOR SOLUTION INTRAMUSCULAR; INTRAVENOUS at 00:33

## 2018-04-14 RX ADMIN — METHYLPREDNISOLONE 37.5 MG: 125 INJECTION, POWDER, LYOPHILIZED, FOR SOLUTION INTRAMUSCULAR; INTRAVENOUS at 01:09

## 2018-04-14 RX ADMIN — ERTAPENEM SODIUM 1 G: 1 INJECTION, POWDER, LYOPHILIZED, FOR SOLUTION INTRAMUSCULAR; INTRAVENOUS at 02:42

## 2018-04-14 RX ADMIN — GENTAMICIN SULFATE 80 MG: 80 INJECTION, SOLUTION INTRAVENOUS at 18:26

## 2018-04-14 RX ADMIN — CLINDAMYCIN PHOSPHATE 900 MG: 18 INJECTION, SOLUTION INTRAVENOUS at 11:33

## 2018-04-14 ASSESSMENT — ACTIVITIES OF DAILY LIVING (ADL)
AMBULATION: 0-->INDEPENDENT
SWALLOWING: 0-->SWALLOWS FOODS/LIQUIDS WITHOUT DIFFICULTY
DRESS: 0-->INDEPENDENT
BATHING: 0-->INDEPENDENT
ADLS_ACUITY_SCORE: 9
ADLS_ACUITY_SCORE: 9
FALL_HISTORY_WITHIN_LAST_SIX_MONTHS: NO
RETIRED_EATING: 0-->INDEPENDENT
COGNITION: 0 - NO COGNITION ISSUES REPORTED
ADLS_ACUITY_SCORE: 9
RETIRED_COMMUNICATION: 0-->UNDERSTANDS/COMMUNICATES WITHOUT DIFFICULTY
TRANSFERRING: 0-->INDEPENDENT
TOILETING: 0-->INDEPENDENT
ADLS_ACUITY_SCORE: 9

## 2018-04-14 NOTE — H&P
Gynecology History and Physical    Name: Cathie Noonan    MRN: [id number]   YOB: 1985                 Chief Complaint:   Pelvic pain    History is obtained from the patient.       History of Present Illness:   Cathie Noonan is a 32 year old  female who is being seen for evaluation of pelvic pain and radiologic evidence of pelvic inflammation or infection.     She states she had acute-onset right lower abdominal pain this afternoon.  She has has noticed increased vaginal discharge over the past 4 days.  No itching or irritation.  No dysuria. No fevers, chills.  When she arrived to the ED, she felt the pain was radiating to midline.    No chest pain, SOB, constipation, diarrhea, dysuria, or edema in extremities noted.  She has had flatulence and belching.    The patient was seen by Dr. Lynette Mckeon in 2017 for preconception counseling and fertility discussion.    Gyn Hx:  Menses: typically regular every 28 days.  Patient had been diagnosed with PCOS in [place] and mild hyperPRL, s/p treatment a repeat testing was reportedly normal.  The workup was prompted by an irregular menstrual interval.  She reports having only a few cycles that have been irregular.  Contraception: none, would like to become pregnant.  H/o STIs: denies  No abnl pap    OB Hx: G0, desires fertility.  Unsuccessful over the past almost year.           Past Medical History:     Past Medical History:   Diagnosis Date     Anemia           Past Surgical History:     Past Surgical History:   Procedure Laterality Date     NO HISTORY OF SURGERY            Social History:    for 3 years.   in [employer].  Previously a graduate level trained nutritionist.  Her  lives in [place] geographically  but in committed relationship.  Feels safe.  Social History   Substance Use Topics     Smoking status: Never Smoker     Smokeless tobacco: Never Used     Alcohol use No           "Family History:     Family History   Problem Relation Age of Onset     Coronary Artery Disease Father      Hypertension Father      DIABETES Father      Hypertension Mother      DIABETES Mother      No family history of  cancers (ovarian, cervical, uterine, breast or colon). No family h/o VTE.          Allergies:   No Known Allergies         Medications:     Current Facility-Administered Medications   Medication     sodium chloride 0.9% infusion     Current Outpatient Prescriptions   Medication Sig     multivitamin, therapeutic with minerals (MULTI-VITAMIN) TABS tablet Take 1 tablet by mouth daily     Prenatal Vit-Fe Fumarate-FA (PRENATAL VITAMIN PLUS LOW IRON) 27-1 MG TABS Take 1 tablet by mouth daily     IBUPROFEN PO Take by mouth every 6 hours as needed for moderate pain     loratadine (CLARITIN) 10 MG capsule Take 10 mg by mouth daily (Patient not taking: Reported on 10/10/2017)             Review of Systems:    Systemic: +2lb wt gain     Skin : + hirsutism, + acne   Eye : No changes in vision.   Pulmonary: No cough or SOB.   Cardiovascular: No CP or palpitations  Gastrointestinal : + nausea, no vomiting. +abd pain  Genitourinary: No dysuria, urgency or bleeding  Psychiatric: No depression or anxiety  Hematologic : No palpable lymph nodes.   Endocrine : No hot flashes. No heat/cold intolerance.       Neurological: No headaches, no numbness.          Physical Exam:     Vitals:    04/13/18 1741 04/13/18 1743 04/13/18 2030   BP: 108/69 108/69 102/60   BP Location:   Left arm   Pulse:   98   Resp:   16   Temp: 98.2  F (36.8  C)  98.2  F (36.8  C)   TempSrc: Oral  Oral   SpO2:  98% 100%   Weight: 53.5 kg (118 lb)     Height: 1.6 m (5' 2.99\")       General: NAD, appears comfortable, slightly ill appearing  HEENT: dry lips, anicteric  Lung:: no respiratory distress, CTAB with no wheezes, rubs or rhonchi  Heart: heart rate regular, S1, S2. No murmurs noted.   Back:  no CVAT bilaterally.  Abdomen: soft, nondistended, " tender in b/l lower quadrants. No organomegaly  Pelvis: normal external genitalia, scant white discharge, cervix appears slightly erythematous, small, nulliparous, no lesions on cervix, no discharge at os.  Bimanual exam reveals cervical motion tenderness, bilateral adnexal tenderness.  Ext: Warm, well perfused, no edema  Neuro: appears intact grossly moving all 4 extremities equally.  Skin: No rashes or ecchymoses noted.      Labs/Imaging     Results for orders placed or performed during the hospital encounter of 04/13/18 (from the past 24 hour(s))   CBC with platelets differential   Result Value Ref Range    WBC 18.2 (H) 4.0 - 11.0 10e9/L    RBC Count 4.66 3.8 - 5.2 10e12/L    Hemoglobin 12.9 11.7 - 15.7 g/dL    Hematocrit 39.4 35.0 - 47.0 %    MCV 85 78 - 100 fl    MCH 27.7 26.5 - 33.0 pg    MCHC 32.7 31.5 - 36.5 g/dL    RDW 12.4 10.0 - 15.0 %    Platelet Count 285 150 - 450 10e9/L    Diff Method Automated Method     % Neutrophils 83.6 %    % Lymphocytes 9.8 %    % Monocytes 5.6 %    % Eosinophils 0.6 %    % Basophils 0.2 %    % Immature Granulocytes 0.2 %    Nucleated RBCs 0 0 /100    Absolute Neutrophil 15.2 (H) 1.6 - 8.3 10e9/L    Absolute Lymphocytes 1.8 0.8 - 5.3 10e9/L    Absolute Monocytes 1.0 0.0 - 1.3 10e9/L    Absolute Eosinophils 0.1 0.0 - 0.7 10e9/L    Absolute Basophils 0.0 0.0 - 0.2 10e9/L    Abs Immature Granulocytes 0.0 0 - 0.4 10e9/L    Absolute Nucleated RBC 0.0    CRP inflammation   Result Value Ref Range    CRP Inflammation 5.4 0.0 - 8.0 mg/L   Basic metabolic panel   Result Value Ref Range    Sodium 137 133 - 144 mmol/L    Potassium 3.9 3.4 - 5.3 mmol/L    Chloride 104 94 - 109 mmol/L    Carbon Dioxide 24 20 - 32 mmol/L    Anion Gap 9 3 - 14 mmol/L    Glucose 92 70 - 99 mg/dL    Urea Nitrogen 9 7 - 30 mg/dL    Creatinine 0.65 0.52 - 1.04 mg/dL    GFR Estimate >90 >60 mL/min/1.7m2    GFR Estimate If Black >90 >60 mL/min/1.7m2    Calcium 9.6 8.5 - 10.1 mg/dL   HCG qualitative pregnancy (blood)    Result Value Ref Range    HCG Qualitative Serum Negative NEG^Negative   Pelvis US, complete    Narrative    EXAMINATION: US PELVIS COMPLETE WITHOUT TRANSVAGINAL, 4/13/2018 7:36  PM     COMPARISON: None.    HISTORY: Right lower quadrant pain, elevated white blood cell count,  not pregnant by prior pregnancy    TECHNIQUE: The pelvis was scanned in standard fashion with a  transabdominal transducer(s) using both grey scale and color Doppler  techniques.    FINDINGS: Patient declined transvaginal evaluation.    The uterus measures 7.2 x 3.7 x 4.1 cm, and there is no evidence of a  focal fibroid.  The endometrium is within normal limits and measures  10.2 mm. There is no free fluid in the pelvis.    The right ovary measures 4.0 x 3.3 x 3.2 cm. There is normal blood  flow with low resistance waveform. There is a heterogenous lesion  within the right ovary measuring 2.5 x 2.1 x 2.9 cm without  significant internal or peripheral blood flow.    The left ovary measures 4.0 x 2.3 x 4.3 cm. There is normal blood flow  with low resistance waveforms. Anechoic structure within the left  ovary measuring 2.6 x 1.8 x 2.5 cm, likely ovarian follicle.      Impression    IMPRESSION:   1.  Limited exam as patient declined transvaginal portion of  examination.  2.  No evidence for acute abnormality such as ovarian torsion.  3.  Heterogenous lesion in the right ovary measuring 2.9 cm most  compatible with a hemorrhagic cyst.    I have personally reviewed the examination and initial interpretation  and I agree with the findings.    TYRONE CORRALES MD   CT Abdomen Pelvis w Contrast    Narrative    EXAMINATION: CT ABDOMEN PELVIS W CONTRAST, 4/13/2018 8:30 PM    TECHNIQUE:  Helical CT images from the lung bases through the  symphysis pubis were obtained with IV contrast. Contrast dose: 73 mL  Isovue 370.    COMPARISON: Same day limited pelvic ultrasound    HISTORY: RLQ pain; elevated WBC.    FINDINGS:    Abdomen and pelvis: No focal liver  lesion or intrahepatic biliary  ductal dilatation. No gallbladder wall thickening, pericholecystic  fluid, or gallstones. The common bile duct measures 4 mm. Pancreas,  spleen, stomach, adrenal glands, and kidneys are normal. No  hydroureter or urinary tract stones. Bilateral rim-enhancing  hypoattenuating lesions within the ovaries largest measuring 3.4 x 2.5  x 3.4 cm on the left. There are 3 separate rim-enhancing  hypoattenuating lesions within the right ovary largest measuring 1.2 x  1.3 x 1.4 cm. Small amount of free fluid within the pelvis. Mild hazy  fat stranding. Uterus appears within normal limits. Mild bladder wall  thickening may be reactive. No significant diverticulosis, paracolonic  fat stranding, or bowel wall thickening. The appendix is normal. The  terminal ileum and remainder of the small bowel are unremarkable. No  mesenteric lymphadenopathy or masses. Major vascular structures  patent. No significant atherosclerotic plaque.     Lung bases: No pneumothorax, pleural effusion, or focal airspace  opacity. Dependent atelectasis in the left lung base.    Bones and soft tissues: No significant inguinal lymphadenopathy. No  suspicious soft tissue mass. No suspicious osseous lesion. No  significant degenerative change of the lumbar spine.      Impression    IMPRESSION:   1. Bilateral rim-enhancing cystic lesions in the ovaries, largest  measuring 3.4 cm on the left with small amount of free pelvic fluid  and hazy stranding.  Given the clinical history, these findings are  concerning for tubo-ovarian abscesses.   2. The appendix is normal. No other acute findings within the abdomen.    These findings were discussed with Dr. Hopkins of the emergency  department on 4/13/2018 at 2116 hours by Dr. Soto.    I have personally reviewed the examination and initial interpretation  and I agree with the findings.    TYRONE CORRALES MD          Assessment and Plan:   Assessment: Cathie Noonan is a 32  year old G0 presenting with pelvic pain, vaginal discharge    Plan:  - admit to gynecology service  - PID/TOA (tubo-ovarian abscess) without sepsis:  CT findings and tenderness on exam are concerning for PID with TOA.  She also has leukocytosis to 18.  Will treat inpatient due to suspected abscesses.  Will treat with cefotetan 2g IV BID and doxycycline 100mg BID per CDC recs.  Continue IV hydration.  Antiemetics prn.  Switch to IV doxy if PO is poorly tolerated.  May discuss additional therapy with Flagyl to be continued with outpatient continued doxycycline treatment.  Will repeat CBC in AM.  Blood cultures pending.  No clear signs of GC/CT, perhaps BV was inciting cause of ascending infection.  F/u GC/CT results.  - pain: acetaminophen, ibuprofen, oxycodone as needed  - flatulence: will offer simethicone prn  - belching: ordered for Tums, no concern for GI pathology at this time.  If continues may consider H. Pylori testing as outpatient.    Patient discussed with supervision of Dr. Sharma.    Melisa Estevez MD  OB/GYN Resident PGY2  4/13/2018 10:17 PM        Negra Sharma MD

## 2018-04-14 NOTE — ED NOTES
[facility], [place]   ED Nurse to Floor Handoff     Cathie Noonan is a 32 year old female who speaks English and lives alone,  in a home  They arrived in the ED by wheelchair from clinic    ED Chief Complaint: Abdominal Pain    ED Dx;   Final diagnoses:   Tubo-ovarian abscess         Needed?: No    Allergies: No Known Allergies.  Past Medical Hx:   Past Medical History:   Diagnosis Date     Anemia 2016      Baseline Mental status: WDL  Current Mental Status changes: at basesline    Infection present or suspected this encounter: yes other ovarian abscess  Sepsis suspected: No  Isolation type: No active isolations     Activity level - Baseline/Home:  Independent  Activity Level - Current:   Independent    Bariatric equipment needed?: No    In the ED these meds were given:   Medications   sodium chloride 0.9% infusion (not administered)   cefoTEtan (CEFOTAN) 1 g vial to attach to  ml bag (not administered)   doxycycline (VIBRAMYCIN) capsule 100 mg (not administered)   0.9% sodium chloride BOLUS (0 mLs Intravenous Stopped 4/13/18 1949)   ketorolac (TORADOL) injection 15 mg (15 mg Intravenous Given 4/13/18 1946)   iopamidol (ISOVUE-370) solution 73 mL (73 mLs Intravenous Given 4/13/18 2018)   sodium chloride 0.9 % bag 500mL for CT scan flush use (68 mLs Intravenous Given 4/13/18 2018)       Drips running?  No    Home pump  No    Current LDAs  Peripheral IV 04/13/18 Right Upper forearm (Active)   Number of days:0       Labs results:   Labs Ordered and Resulted from Time of ED Arrival Up to the Time of Departure from the ED   CBC WITH PLATELETS DIFFERENTIAL - Abnormal; Notable for the following:        Result Value    WBC 18.2 (*)     Absolute Neutrophil 15.2 (*)     All other components within normal limits   CRP INFLAMMATION   BASIC METABOLIC PANEL   HCG QUALITATIVE   IP ASSIGN PROVIDER TEAM TO TREATMENT TEAM   IP ASSIGN PROVIDER TEAM TO TREATMENT TEAM   BLOOD CULTURE    NEISSERIA GONORRHOEAE PCR   CHLAMYDIA TRACHOMATIS PCR       Imaging Studies:   Recent Results (from the past 24 hour(s))   Pelvis US, complete    Narrative    EXAMINATION: US PELVIS COMPLETE WITHOUT TRANSVAGINAL, 4/13/2018 7:36  PM     COMPARISON: None.    HISTORY: Right lower quadrant pain, elevated white blood cell count,  not pregnant by prior pregnancy    TECHNIQUE: The pelvis was scanned in standard fashion with a  transabdominal transducer(s) using both grey scale and color Doppler  techniques.    FINDINGS: Patient declined transvaginal evaluation.    The uterus measures 7.2 x 3.7 x 4.1 cm, and there is no evidence of a  focal fibroid.  The endometrium is within normal limits and measures  10.2 mm. There is no free fluid in the pelvis.    The right ovary measures 4.0 x 3.3 x 3.2 cm. There is normal blood  flow with low resistance waveform. There is a heterogenous lesion  within the right ovary measuring 2.5 x 2.1 x 2.9 cm without  significant internal or peripheral blood flow.    The left ovary measures 4.0 x 2.3 x 4.3 cm. There is normal blood flow  with low resistance waveforms. Anechoic structure within the left  ovary measuring 2.6 x 1.8 x 2.5 cm, likely ovarian follicle.      Impression    IMPRESSION:   1.  Limited exam as patient declined transvaginal portion of  examination.  2.  No evidence for acute abnormality such as ovarian torsion.  3.  Heterogenous lesion in the right ovary measuring 2.9 cm most  compatible with a hemorrhagic cyst.    I have personally reviewed the examination and initial interpretation  and I agree with the findings.    TYRONE CORRALES MD   CT Abdomen Pelvis w Contrast    Narrative    EXAMINATION: CT ABDOMEN PELVIS W CONTRAST, 4/13/2018 8:30 PM    TECHNIQUE:  Helical CT images from the lung bases through the  symphysis pubis were obtained with IV contrast. Contrast dose: 73 mL  Isovue 370.    COMPARISON: Same day limited pelvic ultrasound    HISTORY: RLQ pain; elevated  WBC.    FINDINGS:    Abdomen and pelvis: No focal liver lesion or intrahepatic biliary  ductal dilatation. No gallbladder wall thickening, pericholecystic  fluid, or gallstones. The common bile duct measures 4 mm. Pancreas,  spleen, stomach, adrenal glands, and kidneys are normal. No  hydroureter or urinary tract stones. Bilateral rim-enhancing  hypoattenuating lesions within the ovaries largest measuring 3.4 x 2.5  x 3.4 cm on the left. There are 3 separate rim-enhancing  hypoattenuating lesions within the right ovary largest measuring 1.2 x  1.3 x 1.4 cm. Small amount of free fluid within the pelvis. Mild hazy  fat stranding. Uterus appears within normal limits. Mild bladder wall  thickening may be reactive. No significant diverticulosis, paracolonic  fat stranding, or bowel wall thickening. The appendix is normal. The  terminal ileum and remainder of the small bowel are unremarkable. No  mesenteric lymphadenopathy or masses. Major vascular structures  patent. No significant atherosclerotic plaque.     Lung bases: No pneumothorax, pleural effusion, or focal airspace  opacity. Dependent atelectasis in the left lung base.    Bones and soft tissues: No significant inguinal lymphadenopathy. No  suspicious soft tissue mass. No suspicious osseous lesion. No  significant degenerative change of the lumbar spine.      Impression    IMPRESSION:   1. Bilateral rim-enhancing cystic lesions in the ovaries, largest  measuring 3.4 cm on the left with small amount of free pelvic fluid  and hazy stranding.  Given the clinical history, these findings are  concerning for tubo-ovarian abscesses.   2. The appendix is normal. No other acute findings within the abdomen.    These findings were discussed with Dr. Hopkins of the emergency  department on 4/13/2018 at 2116 hours by Dr. Soto.    I have personally reviewed the examination and initial interpretation  and I agree with the findings.    TYRONE CORRALES MD       Recent  "vital signs:   /60 (BP Location: Left arm)  Pulse 98  Temp 98.2  F (36.8  C) (Oral)  Resp 16  Ht 1.6 m (5' 2.99\")  Wt 53.5 kg (118 lb)  LMP 03/10/2018  SpO2 100%  BMI 20.91 kg/m2    Cardiac Rhythm: Normal Sinus  Pt needs tele? No  Skin/wound Issues: None    Code Status: Full Code    Pain control: good    Nausea control: good    Abnormal labs/tests/findings requiring intervention: See Results Review.    Family present during ED course? No but friend  Family Comments/Social Situation comments: NA    Tasks needing completion: None    [phone] [facility] ED      "

## 2018-04-14 NOTE — PLAN OF CARE
Pt came to unit from [facility] ED at 0400 via transport. Roommate with pt. Vital signs were stable, pt stated her pain in abdomen was well controlled. IV fluids were continued. Oriented to room/call light use.     Alert and oriented. Lung sounds clear. VSS, soft BP's - 103/60. Afebrile. Pain in lower abdomen managed with heat packs, pt declined medication interventions. No vaginal discharge, no issues with voiding, LBM 4/13/2018 and passing gas. LR running at 150cc/hr per orders. PIV in right AC. CMS intact. Steady gait, ambulating stand by assist. Roommate in room with pt. Will continue to monitor.

## 2018-04-14 NOTE — PLAN OF CARE
Problem: Patient Care Overview  Goal: Plan of Care/Patient Progress Review  Outcome: Improving  VSS. Up independently. C/o HA, offered tylenol, pt declined. LS clear, BS +, tolerating reg diet. Pt able to make needs known. Continue POC.

## 2018-04-14 NOTE — PHARMACY-ADMISSION MEDICATION HISTORY
Admission medication history interview status for the 4/13/2018 admission is complete. See Epic admission navigator for allergy information, pharmacy, prior to admission medications and immunization status.     Medication history interview sources:  patient, chart review, Care Everywhere review    Changes made to PTA medication list (reason)  Added: none  Deleted: multivitamin (now just on prenatal vitamin), ibuprofen prn  Changed: loratadine from daily to daily prn    Additional medication history information (including reliability of information, actions taken by pharmacist):  -Patient seemed to be a reliable historian.   -Patient was wondering if she should be taking more vitamin D than what is in her prenatal vitamin. She is worried about having brittle bones. Told her to discuss with provider before discharge.    Prior to Admission medications    Medication Sig Last Dose Taking? Auth Provider   Prenatal Vit-Fe Fumarate-FA (PRENATAL VITAMIN PLUS LOW IRON) 27-1 MG TABS Take 1 tablet by mouth daily 4/12/2018 at Unknown time Yes Lynette Mckeon MD   loratadine (CLARITIN) 10 MG tablet Take 10 mg by mouth daily as needed for allergies More than a month at Unknown time  Unknown, Entered By History     Medication history completed by: Pat Aleman, PharmD, BCPS

## 2018-04-14 NOTE — PHARMACY-AMINOGLYCOSIDE DOSING SERVICE
Pharmacy Aminoglycoside Initial Note  Date of Service 2018  Patient's  1985  32 year old, female    Weight (Actual):  53.5 kg    Indication: Abscess, Pelvic Inflammatory Disease (PID)    Current estimated CrCl = Estimated Creatinine Clearance: 106.9 mL/min (based on Cr of 0.65).    Creatinine for last 3 days  2018:  6:27 PM Creatinine 0.65 mg/dL     Nephrotoxins and other renal medications (Future)    Start     Dose/Rate Route Frequency Ordered Stop    18 1900  gentamicin (GARAMYCIN) infusion 80 mg      1.5 mg/kg × 53.5 kg  over 60 Minutes Intravenous EVERY 8 HOURS 18 1002      18 1100  gentamicin (GARAMYCIN) infusion 100 mg      2 mg/kg × 53.5 kg  over 60 Minutes Intravenous ONCE 18 1001      18 0403  ibuprofen (ADVIL/MOTRIN) tablet 600 mg      600 mg Oral EVERY 6 HOURS PRN 18 0403            Contrast Orders - past 72 hours (72h ago through future)    Start     Dose/Rate Route Frequency Ordered Stop    18  iopamidol (ISOVUE-370) solution 73 mL      73 mL Intravenous ONCE 18          Aminoglycoside Levels - past 2 days  No results found for requested labs within last 48 hours.    Aminoglycosides IV Administrations (past 72 hours)      No aminoglycosides orders with administrations in past 72 hours.                    Plan:  1.  Start Gentamicin 100 mg (2 mg/kg) IV x 1 loading dose, then gentamicin 80mg (1.5mg/kg) IV q8h.   2.  Target goals based on conventional dosing  3.  Goal peak level: 6-8 mg/L  4.  Goal trough level: </= 1 mg/L  5.  Pharmacy will continue to follow and check levels as appropriate in 1-3 Days    Pat Aleman, JoseyD, BCPS

## 2018-04-14 NOTE — PROGRESS NOTES
Gynecology Progress Note    Subjective:  Patient is resting comfortably in bed.  Notes improvement in pain this morning after pain medication and initiation of antibiotics. She had one episode of emesis last evening, but since then her nausea has improved. She is voiding spontaneously and passing flatus. Ambulating without dizziness or difficulty.  She denies any nausea/vomiting, chest pain, shortness of breath, or other systemic complaints.    Objective:  Vitals:    18 0330 18 0337 18 0405 18 0700   BP: 90/59 90/58 103/60 97/57   BP Location:   Left arm Left arm   Pulse:   95 86   Resp:   18 17   Temp:   97  F (36.1  C) 95.5  F (35.3  C)   TempSrc:   Oral Oral   SpO2:   98% 100%   Weight:       Height:           General:  A&Ox3. NAD. Resting in bed  CV: RRR.  Pulm: CTAB. Normal respiratory effort.  Abd: Soft, non-distended. Mild diffuse tenderness to palpation.   Ext: Trace edema    # Pain Assessment:  Current Pain Score 2018   Patient currently in pain? yes   Pain location Abdomen   - Cathie is experiencing pain due to new diagnosis of TOA/PID. Pain management was discussed and the plan was created in a collaborative fashion.  Cathie's response to the current recommendations: engaged  - Opioid regimen: Roxicodone  - Response to opioid medications: Reduction of symptoms  - Bowel regimen: senna  - Pharmacologic adjuvants: NSAIDs and Acetaminophen  - Non-pharmacologic adjuvants: Heat    Assessment/Plan:  Cathie Noonan is a 32 year old  female who is HD#2 for management of TOA/PID.  Doing well post-operatively.    - PID/TOA (tubo-ovarian abscess) without sepsis:     - Vital signs stable. Initial mild tachycardia improved with IV fluid rehydration   - Examination notable for abdominal tenderness, but with significant improvement this morning   - CT findings with bilateral rim-enhancing cystic lesions in the ovaries, largest measuring 3.4 cm on the left with small amount of free  pelvic fluid and hazy stranding.   - Leukocytosis 18.2>12.0.   - Management:    - Continue close monitoring of vital signs.    - Continue IV Hydration. Anti-emetics and stool softeners PRN    - Pain: acetaminophen, ibuprofen, oxycodone as needed    - Antibiotics: Initial regimen of Cefotetan 2g IV BID and doxycycline 100mg BID. Episode of rigors/hypotension in the ED. Unclear origin but though to be a possible adverse reaction to Cefotetan and thus it was discontinued and she was transitioned to IV ertapenem in the ED.  This is an unusual antibiotic regimen for TOA/PID and thus we will make contact with pharmacy discuss other alternatives, such as Clinda/Gentamycin. Plan for a 24 hour period of IV antibiotics and symptomatic improvement prior to transition to PO medications.    - F/u GC/CT and Blood cultures.    Ember Moreira MD  OB/GYN Resident, PGY-3  4/14/2018 8:08 AM      Staff MD Note    I appreciate the note by Dr. Moreira.  Any necessary changes have been made by me.  I evaluated the patient with the resident and agree with the assessment and plan.  Agree with transition to clindamycin/gentamicin.  Patient is clinically improving.  Will continue to monitor closely.    Sandra Palacios MD

## 2018-04-15 VITALS
BODY MASS INDEX: 20.91 KG/M2 | OXYGEN SATURATION: 100 % | HEIGHT: 63 IN | TEMPERATURE: 97.5 F | SYSTOLIC BLOOD PRESSURE: 96 MMHG | WEIGHT: 118 LBS | HEART RATE: 82 BPM | DIASTOLIC BLOOD PRESSURE: 52 MMHG | RESPIRATION RATE: 18 BRPM

## 2018-04-15 LAB
C TRACH DNA SPEC QL NAA+PROBE: NEGATIVE
CREAT SERPL-MCNC: 0.57 MG/DL (ref 0.52–1.04)
ERYTHROCYTE [DISTWIDTH] IN BLOOD BY AUTOMATED COUNT: 12.4 % (ref 10–15)
GFR SERPL CREATININE-BSD FRML MDRD: >90 ML/MIN/1.7M2
HCT VFR BLD AUTO: 31.2 % (ref 35–47)
HGB BLD-MCNC: 9.9 G/DL (ref 11.7–15.7)
MCH RBC QN AUTO: 27.1 PG (ref 26.5–33)
MCHC RBC AUTO-ENTMCNC: 31.7 G/DL (ref 31.5–36.5)
MCV RBC AUTO: 86 FL (ref 78–100)
N GONORRHOEA DNA SPEC QL NAA+PROBE: NEGATIVE
PLATELET # BLD AUTO: 207 10E9/L (ref 150–450)
RBC # BLD AUTO: 3.65 10E12/L (ref 3.8–5.2)
SPECIMEN SOURCE: NORMAL
SPECIMEN SOURCE: NORMAL
WBC # BLD AUTO: 8.5 10E9/L (ref 4–11)

## 2018-04-15 PROCEDURE — 82565 ASSAY OF CREATININE: CPT | Performed by: OBSTETRICS & GYNECOLOGY

## 2018-04-15 PROCEDURE — 25000128 H RX IP 250 OP 636: Performed by: OBSTETRICS & GYNECOLOGY

## 2018-04-15 PROCEDURE — 25000132 ZZH RX MED GY IP 250 OP 250 PS 637: Performed by: STUDENT IN AN ORGANIZED HEALTH CARE EDUCATION/TRAINING PROGRAM

## 2018-04-15 PROCEDURE — 25000125 ZZHC RX 250: Performed by: STUDENT IN AN ORGANIZED HEALTH CARE EDUCATION/TRAINING PROGRAM

## 2018-04-15 PROCEDURE — 85027 COMPLETE CBC AUTOMATED: CPT | Performed by: OBSTETRICS & GYNECOLOGY

## 2018-04-15 PROCEDURE — 36415 COLL VENOUS BLD VENIPUNCTURE: CPT | Performed by: OBSTETRICS & GYNECOLOGY

## 2018-04-15 RX ORDER — METRONIDAZOLE 250 MG/1
500 TABLET ORAL EVERY 12 HOURS SCHEDULED
Status: DISCONTINUED | OUTPATIENT
Start: 2018-04-15 | End: 2018-04-15 | Stop reason: HOSPADM

## 2018-04-15 RX ORDER — METRONIDAZOLE 500 MG/1
500 TABLET ORAL EVERY 12 HOURS
Qty: 28 TABLET | Refills: 0 | Status: SHIPPED | OUTPATIENT
Start: 2018-04-15 | End: 2018-04-29

## 2018-04-15 RX ORDER — DOXYCYCLINE 100 MG/1
100 CAPSULE ORAL EVERY 12 HOURS SCHEDULED
Status: DISCONTINUED | OUTPATIENT
Start: 2018-04-15 | End: 2018-04-15 | Stop reason: HOSPADM

## 2018-04-15 RX ORDER — DOXYCYCLINE 100 MG/1
100 CAPSULE ORAL EVERY 12 HOURS
Qty: 28 CAPSULE | Refills: 0 | Status: SHIPPED | OUTPATIENT
Start: 2018-04-15 | End: 2018-04-29

## 2018-04-15 RX ORDER — METRONIDAZOLE 250 MG/1
250 TABLET ORAL EVERY 12 HOURS SCHEDULED
Status: DISCONTINUED | OUTPATIENT
Start: 2018-04-15 | End: 2018-04-15

## 2018-04-15 RX ADMIN — DOXYCYCLINE HYCLATE 100 MG: 100 CAPSULE ORAL at 07:28

## 2018-04-15 RX ADMIN — METRONIDAZOLE 250 MG: 250 TABLET ORAL at 07:28

## 2018-04-15 RX ADMIN — CLINDAMYCIN PHOSPHATE 900 MG: 18 INJECTION, SOLUTION INTRAVENOUS at 02:15

## 2018-04-15 RX ADMIN — GENTAMICIN SULFATE 80 MG: 80 INJECTION, SOLUTION INTRAVENOUS at 03:28

## 2018-04-15 RX ADMIN — DOCUSATE SODIUM 100 MG: 100 CAPSULE, LIQUID FILLED ORAL at 07:28

## 2018-04-15 ASSESSMENT — ACTIVITIES OF DAILY LIVING (ADL)
ADLS_ACUITY_SCORE: 9

## 2018-04-15 NOTE — PROGRESS NOTES
Gynecology Progress Note  4/15/2018     Subjective:  Cathie is comfortable. She has no pain. She does not feel feverish. Has a good appetite. Her L arm hurts at IV site, would like this out. Many questions about why she got this infection.     Objective:  Vitals:    18 0700 18 1455 18 2229 04/15/18 0500   BP: 97/57 97/49 97/55 103/63   BP Location: Left arm Left arm Right arm Right arm   Pulse: 86 93 91 72   Resp: 17 16 16 16   Temp: 95.5  F (35.3  C) 97.8  F (36.6  C) 97.7  F (36.5  C) 98.3  F (36.8  C)   TempSrc: Oral Oral Oral Oral   SpO2: 100% 98% 99%    Weight:       Height:         General:  A&Ox3. NAD. Resting in bed  CV: RRR.  Pulm: CTAB. Normal respiratory effort.  Abd: Soft, non-distended. Not tender to palpation.   Ext: Trace edema    # Pain Assessment:  Current Pain Score 4/15/2018   Patient currently in pain? denies   Pain location -   Pain descriptors -   - Cathie is experiencing pain due to new diagnosis of TOA/PID. Pain management was discussed and the plan was created in a collaborative fashion.  Cathie's response to the current recommendations: engaged  - Opioid regimen: Roxicodone  - Response to opioid medications: Reduction of symptoms  - Bowel regimen: senna  - Pharmacologic adjuvants: NSAIDs and Acetaminophen  - Non-pharmacologic adjuvants: Heat     Results for CATHIE DON (MRN [id number]) as of 4/15/2018 06:41   4/15/2018 06:13   WBC 8.5   Hemoglobin 9.9 (L)   Hematocrit 31.2 (L)   Platelet Count 207   RBC Count 3.65 (L)   MCV 86   MCH 27.1   MCHC 31.7   RDW 12.4       Assessment/Plan:  Cathie Don is a 32 year old  female who is HD#3 for management of TOA/PID.  Doing well post-operatively.    - PID/TOA (tubo-ovarian abscess) without sepsis:     - Vital signs stable. Initial mild tachycardia improved with IV fluid rehydration   - Examination notable for abdominal tenderness, but with significant improvement this morning   - CT findings with bilateral  rim-enhancing cystic lesions in the ovaries, largest measuring 3.4 cm on the left with small amount of free pelvic fluid and hazy stranding.   - Leukocytosis 18.2>12.0 > 8.5, now normalized   - Management:    - Continue close monitoring of vital signs.    - Discontinue IV Hydration as improved PO intake. Anti-emetics and stool softeners PRN    - Pain: acetaminophen, ibuprofen, oxycodone as needed    - Antibiotics: This morning started on PO Flagyl 500 mg BID and doxycycline 100 mg PO BID, plan 14 day course. Initial regimen of Cefotetan 2g IV BID and doxycycline 100mg BID. Episode of rigors/hypotension in the ED. Unclear origin but though to be a possible adverse reaction to Cefotetan and thus it was discontinued and she was transitioned to IV ertapenem in the ED.  This is an unusual antibiotic regimen for TOA/PID and thus we will make contact with pharmacy discuss other alternatives, such as Clinda/Gentamycin.     - still pending GC/CT and Blood cultures.    Consider discharge later today if continued clinical improvement on PO medications.     Leila Bradshaw, PGY3  OB/Gyn  4/15/2018, 6:45 AM    OB/GYN Staff -- Pt seen and examined by me. Agree with note as above.  MD Robin

## 2018-04-15 NOTE — PLAN OF CARE
Problem: Patient Care Overview  Goal: Plan of Care/Patient Progress Review  Outcome: Adequate for Discharge Date Met: 04/15/18  D: Pt  A/O x4. VSS . Lungs- CL, no c/o chest pain/ SOB. sats=98  % RA.  . Bowel+, passing gas. PP- +. No edema. CMS- intact. Pt taking PO REG  food/ fluids well. Voiding Good amounts in BR. Up walks halls INDEPENDENTLY.. Pain/CAPA - denies. All DC instructions reviewed with pt and pt will  DC meds at [facility] pharm d. Pt ia taking security shuttle  Across [place].  A: Pt DC at this posted time.

## 2018-04-15 NOTE — PLAN OF CARE
Problem: Patient Care Overview  Goal: Plan of Care/Patient Progress Review  A&Ox4. VSS on room air. Independently ambulating in room. New PIV placed in left forearm, patient reporting some discomfort, IV abx infusing without difficulty, area soft and no redness observed- will continue to monitor. Denying any other pain.  and roommate at bedside. Patient ate 75% of dinner, later requested PB&J sandwich and ate 100%, appetite improving. Patient is able to make needs known. Plan is to switch to PO abx and patient is hoping to discharge tomorrow 4/15.

## 2018-04-15 NOTE — PLAN OF CARE
"Problem: Patient Care Overview  Goal: Plan of Care/Patient Progress Review  Outcome: Improving  VSS. A&Ox4. Able to make needs known. Pt. up independently. Pt. complains of discomfort in areas surrounding L. PIV during NS flushes. No redness, skin surrounding PIV is soft, no S&S of phlebitis or infiltration. IV fluids and ABX infusing with no difficulty. Heat pack applied, patient states \"it helps\". PCDs on. Skin WDL. LS clear, BS +, Continue to monitor.        "

## 2018-04-16 ENCOUNTER — TELEPHONE (OUTPATIENT)
Dept: OBGYN | Facility: CLINIC | Age: 33
End: 2018-04-16

## 2018-04-16 NOTE — TELEPHONE ENCOUNTER
Pt called w/ new and watery diarrhea since this AM. She was d/c yesterday after obs for TOA vs PID.  On Doxy and Flagyl now. Denies fever or pain.   Rec fluid hydration and lytes. Pt to call if she does not improve for 24 hrs. Pt agrees.      Gayatri Nuñez MD, FACOG  [facility] Staff  OB/GYN    4/16/2018  6:58 PM

## 2018-04-19 LAB
BACTERIA SPEC CULT: NO GROWTH
SPECIMEN SOURCE: NORMAL

## 2018-04-20 LAB
BACTERIA SPEC CULT: NO GROWTH
SPECIMEN SOURCE: NORMAL

## 2018-04-25 ENCOUNTER — MYC MEDICAL ADVICE (OUTPATIENT)
Dept: OBGYN | Facility: CLINIC | Age: 33
End: 2018-04-25

## 2018-04-27 NOTE — TELEPHONE ENCOUNTER
Faxed prolactin, progesterone, and hemoglobin a1c lab orders to [facility] at [phone]. Wrote on fax cover sheet to sent results back to [facility].

## 2018-05-01 ENCOUNTER — TELEPHONE (OUTPATIENT)
Dept: OBGYN | Facility: CLINIC | Age: 33
End: 2018-05-01

## 2018-05-01 ENCOUNTER — MYC MEDICAL ADVICE (OUTPATIENT)
Dept: OBGYN | Facility: CLINIC | Age: 33
End: 2018-05-01

## 2018-05-01 ENCOUNTER — TRANSFERRED RECORDS (OUTPATIENT)
Dept: HEALTH INFORMATION MANAGEMENT | Facility: CLINIC | Age: 33
End: 2018-05-01

## 2018-05-01 NOTE — TELEPHONE ENCOUNTER
Tried to reach Cathie but received voicemail.  Left message to call back to discuss symptoms with triage nurse.

## 2018-05-01 NOTE — TELEPHONE ENCOUNTER
Spoke with Cathie. She states that the burning sensation has subsided since she increased her water intake today. Denies any severe pain, but does report still some very mild pelvic pain. 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 She has been eating and drinking normally. Normal bowel habits. Denies any bleeding.

## 2018-05-03 ENCOUNTER — OFFICE VISIT (OUTPATIENT)
Dept: OBGYN | Facility: CLINIC | Age: 33
End: 2018-05-03
Attending: OBSTETRICS & GYNECOLOGY
Payer: COMMERCIAL

## 2018-05-03 VITALS
HEIGHT: 63 IN | BODY MASS INDEX: 20.91 KG/M2 | DIASTOLIC BLOOD PRESSURE: 67 MMHG | HEART RATE: 65 BPM | SYSTOLIC BLOOD PRESSURE: 102 MMHG | WEIGHT: 118 LBS

## 2018-05-03 DIAGNOSIS — N70.93 TOA (TUBO-OVARIAN ABSCESS): Primary | ICD-10-CM

## 2018-05-03 DIAGNOSIS — Z31.41 FERTILITY TESTING: ICD-10-CM

## 2018-05-03 DIAGNOSIS — B37.31 YEAST INFECTION OF THE VAGINA: ICD-10-CM

## 2018-05-03 RX ORDER — FLUCONAZOLE 200 MG/1
200 TABLET ORAL ONCE
Qty: 1 TABLET | Refills: 0 | Status: SHIPPED | OUTPATIENT
Start: 2018-05-03 | End: 2018-05-03

## 2018-05-03 NOTE — PATIENT INSTRUCTIONS
When you come back from [place]- call with your next period to schedule HSG -[phone]     should do semen analysis    Come back after HSG to discuss results and plan going forward

## 2018-05-03 NOTE — LETTER
"5/3/2018       RE: Cathie Noonan  [address]  [address]  [place] [address]     Dear Colleague,    Thank you for referring your patient, Cathie Noonan, to the [facility] at [facility]. Please see a copy of my visit note below.    [facility] Visit    CC: Follow up TOA and infertilty    S: 32 year old P0 here for follow up from recent hospitalzation for presumed TOA. Is feeling well since discharge. Did have some dizziness that she thought might be related to the doxycyline.     Is most concerned today about fertility concerns. Has been attempting pregnancy for over 1 year. Did have elevated prolactin in [place] for which she took carbergoline for a short time. Had repeat labs that were normal last week. Has been told she has PCOS in past.     Since treatment with carbergoline, menses have regulated. Also today notes she has some white, itchy discharge over past few days.     O: /67  Pulse 65  Ht 1.6 m (5' 2.99\")  Wt 53.5 kg (118 lb)  LMP 04/16/2018 (Exact Date)  BMI 20.91 kg/m2  General: No distress  Abdomen: Soft, non-tender, non-distended, no masses  Pelvic Exam:  Vulva: No external lesions, normal hair distribution, normal architecture  Vagina: Moist, pink, thick white discharge, well rugated, no lesions  Cervix: smooth, pink, no visible lesions  Uterus: Normal size, anteverted, non-tender, mobile  Adnexa: Non-tender, no masses    Wet prep: +yeast    A:32 year old here for follow up from TOA, primary infertility with ?PCOS and yeast vaginitis    P:Diflucan x 1   HSG in 6+ weeks  Follow up for evaluation of PCOS and possible OI vs referral to [facility]  Semen analysis for     Total time spent was 15 minutes, over 50% spent in counseling.    Again, thank you for allowing me to participate in the care of your patient.      Sincerely,    Lynette Mckeon MD      "

## 2018-05-03 NOTE — MR AVS SNAPSHOT
"              After Visit Summary   5/3/2018    Cathie Noonan    MRN: [id number]           Patient Information     Date Of Birth          1985        Visit Information        Provider Department      5/3/2018 1:45 PM Lynette Mckeon MD [facility]        Today's Diagnoses     Yeast infection of the vagina    -  1      Care Instructions    When you come back from [facility]- call with your next period to schedule HSG -[phone]     should do semen analysis    Come back after HSG to discuss results and plan going forward          Follow-ups after your visit        Who to contact     Please call your clinic at [phone] to:    Ask questions about your health    Make or cancel appointments    Discuss your medicines    Learn about your test results    Speak to your doctor            Additional Information About Your Visit        [employer] Information     [employer] gives you secure access to your electronic health record. If you see a primary care provider, you can also send messages to your care team and make appointments. If you have questions, please call your primary care clinic.  If you do not have a primary care provider, please call [phone] and they will assist you.      [employer] is an electronic gateway that provides easy, online access to your medical records. With [employer], you can request a clinic appointment, read your test results, renew a prescription or communicate with your care team.     To access your existing account, please contact your [facility] or call [phone] for assistance.        Care EveryWhere ID     This is your Care EveryWhere ID. This could be used by other organizations to access your [place] medical records  [id number]        Your Vitals Were     Pulse Height Last Period BMI (Body Mass Index)          65 1.6 m (5' 2.99\") 04/16/2018 (Exact Date) 20.91 kg/m2         Blood Pressure from Last 3 Encounters: "   05/03/18 102/67   04/15/18 96/52   10/10/17 102/69    Weight from Last 3 Encounters:   05/03/18 53.5 kg (118 lb)   04/13/18 53.5 kg (118 lb)   10/10/17 50.8 kg (111 lb 14.4 oz)              Today, you had the following     No orders found for display         Today's Medication Changes          These changes are accurate as of 5/3/18  2:18 PM.  If you have any questions, ask your nurse or doctor.               Start taking these medicines.        Dose/Directions    fluconazole 200 MG tablet   Commonly known as:  DIFLUCAN   Used for:  Yeast infection of the vagina   Started by:  Lynette Mckeon MD        Dose:  200 mg   Take 1 tablet (200 mg) by mouth once for 1 dose   Quantity:  1 tablet   Refills:  0            Where to get your medicines      These medications were sent to [facility] [place], [place] - [address]  [address] [address]     Phone:  [phone]     fluconazole 200 MG tablet                Primary Care Provider Fax #    Physician No Ref-Primary [phone]       No address on file        Equal Access to Services     [facility] : Hadreynold Dela Cruz, waessie schmid, qaybta kaalmicaela flynn, heriberto olivares. So [facility] [phone].    ATENCIÓN: Si habla español, tiene a holguin disposición servicios gratuitos de asistencia lingüística. Sofia al [phone].    We comply with applicable federal civil rights laws and [place] laws. We do not discriminate on the basis of race, color, national origin, age, disability, sex, sexual orientation, or gender identity.            Thank you!     Thank you for choosing [facility]  for your care. Our goal is always to provide you with excellent care. Hearing back from our patients is one way we can continue to improve our services. Please take a few minutes to complete the written survey that you may receive in the mail after your visit with us. Thank  you!             Your Updated Medication List - Protect others around you: Learn how to safely use, store and throw away your medicines at www.disposemymeds.org.          This list is accurate as of 5/3/18  2:18 PM.  Always use your most recent med list.                   Brand Name Dispense Instructions for use Diagnosis    fluconazole 200 MG tablet    DIFLUCAN    1 tablet    Take 1 tablet (200 mg) by mouth once for 1 dose    Yeast infection of the vagina       loratadine 10 MG tablet    CLARITIN     Take 10 mg by mouth daily as needed for allergies        PRENATAL VITAMIN PLUS LOW IRON 27-1 MG Tabs     90 tablet    Take 1 tablet by mouth daily    Encounter for preconception consultation

## 2018-05-07 NOTE — PROGRESS NOTES
"[facility] Visit    CC: Follow up TOA and infertilty    S: 32 year old P0 here for follow up from recent hospitalzation for presumed TOA. Is feeling well since discharge. Did have some dizziness that she thought might be related to the doxycyline.     Is most concerned today about fertility concerns. Has been attempting pregnancy for over 1 year. Did have elevated prolactin in [place] for which she took carbergoline for a short time. Had repeat labs that were normal last week. Has been told she has PCOS in past.     Since treatment with carbergoline, menses have regulated. Also today notes she has some white, itchy discharge over past few days.     O: /67  Pulse 65  Ht 1.6 m (5' 2.99\")  Wt 53.5 kg (118 lb)  LMP 04/16/2018 (Exact Date)  BMI 20.91 kg/m2  General: No distress  Abdomen: Soft, non-tender, non-distended, no masses  Pelvic Exam:  Vulva: No external lesions, normal hair distribution, normal architecture  Vagina: Moist, pink, thick white discharge, well rugated, no lesions  Cervix: smooth, pink, no visible lesions  Uterus: Normal size, anteverted, non-tender, mobile  Adnexa: Non-tender, no masses    Wet prep: +yeast    A:32 year old here for follow up from TO, primary infertility with ?PCOS and yeast vaginitis    P:Diflucan x 1   HSG in 6+ weeks  Follow up for evaluation of PCOS and possible OI vs referral to [facility]  Semen analysis for     Total time spent was 15 minutes, over 50% spent in counseling.    Lynette Mckeon MD FACOG  "

## 2018-05-08 ENCOUNTER — MYC MEDICAL ADVICE (OUTPATIENT)
Dept: OBGYN | Facility: CLINIC | Age: 33
End: 2018-05-08

## 2018-05-08 DIAGNOSIS — B37.31 YEAST INFECTION OF THE VAGINA: Primary | ICD-10-CM

## 2018-05-09 RX ORDER — FLUCONAZOLE 150 MG/1
150 TABLET ORAL ONCE
Qty: 1 TABLET | Refills: 0 | Status: SHIPPED | OUTPATIENT
Start: 2018-05-09 | End: 2018-05-09

## 2018-05-09 NOTE — TELEPHONE ENCOUNTER
Sent pt mychart message to pt stating another fluconazole will be sent to pharmacy and to call if symptoms do not completely resolve.

## 2018-06-29 ENCOUNTER — TELEPHONE (OUTPATIENT)
Dept: OBGYN | Facility: CLINIC | Age: 33
End: 2018-06-29

## 2018-06-29 ENCOUNTER — HOSPITAL ENCOUNTER (OUTPATIENT)
Dept: GENERAL RADIOLOGY | Facility: CLINIC | Age: 33
Discharge: HOME | End: 2018-06-29
Attending: OBSTETRICS & GYNECOLOGY | Admitting: OBSTETRICS & GYNECOLOGY
Payer: COMMERCIAL

## 2018-06-29 ENCOUNTER — ALLIED HEALTH/NURSE VISIT (OUTPATIENT)
Dept: OBGYN | Facility: CLINIC | Age: 33
End: 2018-06-29
Attending: OBSTETRICS & GYNECOLOGY
Payer: COMMERCIAL

## 2018-06-29 DIAGNOSIS — R30.0 DYSURIA: Primary | ICD-10-CM

## 2018-06-29 DIAGNOSIS — Z31.69 ENCOUNTER FOR PRECONCEPTION CONSULTATION: ICD-10-CM

## 2018-06-29 DIAGNOSIS — N70.93 TOA (TUBO-OVARIAN ABSCESS): ICD-10-CM

## 2018-06-29 DIAGNOSIS — Z01.812 PRE-PROCEDURE LAB EXAM: Primary | ICD-10-CM

## 2018-06-29 LAB
ALBUMIN UR-MCNC: NEGATIVE MG/DL
APPEARANCE UR: CLEAR
BILIRUB UR QL STRIP: NEGATIVE
COLOR UR AUTO: ABNORMAL
GLUCOSE UR STRIP-MCNC: NEGATIVE MG/DL
HCG UR QL: NEGATIVE
HGB UR QL STRIP: ABNORMAL
INTERNAL QC OK POCT: YES
KETONES UR STRIP-MCNC: NEGATIVE MG/DL
LEUKOCYTE ESTERASE UR QL STRIP: NEGATIVE
MUCOUS THREADS #/AREA URNS LPF: PRESENT /LPF
NITRATE UR QL: NEGATIVE
PH UR STRIP: 6.5 PH (ref 5–7)
RBC #/AREA URNS AUTO: 84 /HPF (ref 0–2)
SOURCE: ABNORMAL
SP GR UR STRIP: 1.01 (ref 1–1.03)
SQUAMOUS #/AREA URNS AUTO: <1 /HPF (ref 0–1)
UROBILINOGEN UR STRIP-MCNC: NORMAL MG/DL (ref 0–2)
WBC #/AREA URNS AUTO: 2 /HPF (ref 0–5)

## 2018-06-29 PROCEDURE — 87086 URINE CULTURE/COLONY COUNT: CPT | Performed by: OBSTETRICS & GYNECOLOGY

## 2018-06-29 PROCEDURE — 25000125 ZZHC RX 250: Performed by: OBSTETRICS & GYNECOLOGY

## 2018-06-29 PROCEDURE — 74740 X-RAY FEMALE GENITAL TRACT: CPT

## 2018-06-29 PROCEDURE — 25500064 ZZH RX 255 OP 636: Performed by: OBSTETRICS & GYNECOLOGY

## 2018-06-29 PROCEDURE — 40000269 ZZH STATISTIC NO CHARGE FACILITY FEE: Mod: ZF

## 2018-06-29 PROCEDURE — 81025 URINE PREGNANCY TEST: CPT | Mod: ZF

## 2018-06-29 PROCEDURE — 81001 URINALYSIS AUTO W/SCOPE: CPT | Performed by: OBSTETRICS & GYNECOLOGY

## 2018-06-29 RX ORDER — IOPAMIDOL 510 MG/ML
50 INJECTION, SOLUTION INTRAVASCULAR ONCE
Status: COMPLETED | OUTPATIENT
Start: 2018-06-29 | End: 2018-06-29

## 2018-06-29 RX ORDER — VITAMIN A, VITAMIN C, VITAMIN D-3, VITAMIN E, VITAMIN B-1, VITAMIN B-2, NIACIN, VITAMIN B-6, CALCIUM, IRON, ZINC, COPPER 4000; 120; 400; 22; 1.84; 3; 20; 10; 1; 12; 200; 27; 25; 2 [IU]/1; MG/1; [IU]/1; MG/1; MG/1; MG/1; MG/1; MG/1; MG/1; UG/1; MG/1; MG/1; MG/1; MG/1
1 TABLET ORAL DAILY
Qty: 90 TABLET | Refills: 3 | Status: SHIPPED | OUTPATIENT
Start: 2018-06-29 | End: 2018-07-31

## 2018-06-29 RX ADMIN — LIDOCAINE HYDROCHLORIDE 1 ML: 10 INJECTION, SOLUTION EPIDURAL; INFILTRATION; INTRACAUDAL; PERINEURAL at 09:58

## 2018-06-29 RX ADMIN — IOPAMIDOL 40 ML: 510 INJECTION, SOLUTION INTRAVASCULAR at 09:57

## 2018-06-29 NOTE — MR AVS SNAPSHOT
After Visit Summary   6/29/2018    Cathie Noonan    MRN: [id number]           Patient Information     Date Of Birth          1985        Visit Information        Provider Department      6/29/2018 8:30 AM Nurse, Stephanie [facility]        Today's Diagnoses     Pre-procedure lab exam    -  1       Follow-ups after your visit        Who to contact     Please call your clinic at [phone] to:    Ask questions about your health    Make or cancel appointments    Discuss your medicines    Learn about your test results    Speak to your doctor            Additional Information About Your Visit        [employer] Information     [employer] gives you secure access to your electronic health record. If you see a primary care provider, you can also send messages to your care team and make appointments. If you have questions, please call your primary care clinic.  If you do not have a primary care provider, please call [phone] and they will assist you.      [employer] is an electronic gateway that provides easy, online access to your medical records. With [employer], you can request a clinic appointment, read your test results, renew a prescription or communicate with your care team.     To access your existing account, please contact your [facility] or call [phone] for assistance.        Care EveryWhere ID     This is your Care EveryWhere ID. This could be used by other organizations to access your [place] medical records  [id number]         Blood Pressure from Last 3 Encounters:   05/03/18 102/67   04/15/18 96/52   10/10/17 102/69    Weight from Last 3 Encounters:   05/03/18 53.5 kg (118 lb)   04/13/18 53.5 kg (118 lb)   10/10/17 50.8 kg (111 lb 14.4 oz)              We Performed the Following     hCG qual urine POCT        Primary Care Provider Fax #    Physician No Ref-Primary [phone]       No address on file        Equal Access to Services      JOSSELYN LEONARDO : Hadii aad hung sheri Dela Cruz, waceciliada luqadaha, qaybta kaalmicaela ericmadiewilliam, heriberto jacksonwillycynthia olivares. So [facility] [phone].    ATENCIÓN: Si habla español, tiene a holguin disposición servicios gratuitos de asistencia lingüística. Llame al [phone].    We comply with applicable federal civil rights laws and [place] laws. We do not discriminate on the basis of race, color, national origin, age, disability, sex, sexual orientation, or gender identity.            Thank you!     Thank you for choosing [facility]  for your care. Our goal is always to provide you with excellent care. Hearing back from our patients is one way we can continue to improve our services. Please take a few minutes to complete the written survey that you may receive in the mail after your visit with us. Thank you!             Your Updated Medication List - Protect others around you: Learn how to safely use, store and throw away your medicines at www.disposemymeds.org.          This list is accurate as of 6/29/18  9:04 AM.  Always use your most recent med list.                   Brand Name Dispense Instructions for use Diagnosis    loratadine 10 MG tablet    CLARITIN     Take 10 mg by mouth daily as needed for allergies        PRENATAL VITAMIN PLUS LOW IRON 27-1 MG Tabs     90 tablet    Take 1 tablet by mouth daily    Encounter for preconception consultation

## 2018-06-29 NOTE — PROCEDURES
Procedure: Hysterosalpingogram (HSG) Contrast Injection    Preoperative Diagnosis:  History of right TOA, Infertility  Postoperative Diagnosis:  Same as above, bilateral patent tubes    :  Sandra Palacios MD  Anesthesia:  Local infiltration of 1% Xylocaine (1 ml)  Contrast:  ISOVUE-250 (40 ml)  Complications:  None    Procedure Description:  The patient was placed in the dorsolithotomy position and a single-hinged vaginal speculum was inserted.  The cervix was exposed and washed with Hibiclens antiseptic solution three times.  Local anesthesia was established with 1% Xylocaine injected into the anterior lip of the cervix utilizing a 22-gauge spinal needle.  The cervix was grasped with a single toothed tenaculum.  The HSG cannula was attached to a 30-cc syringe containing water-soluble contrast (ISOVUE-250) and the cannula was filled with contrast removing air from it.  The tip of the cannula was then introduced into the external cervical os with the cone of the cannula applied tightly to the cervix.  Contrast was injected to image the uterine cavity and fallopian tubes under fluoroscopic monitoring.            Findings:    Uterine cavity:  Normal cavity   Fallopian tubes:  Patent with spill noted bilaterally    Impression:  Normal HSG, patent tubes bilaterally    Sandra Palacios MD

## 2018-06-29 NOTE — TELEPHONE ENCOUNTER
----- Message from Roseann Baez sent at 6/29/2018 10:22 AM CDT -----  Regarding: Radiology Results & Refill Prenatals  Please call with radiology results from this morning, and also needs prenatal vitamins refilled.  Uses [facility] pharmacy on [address]

## 2018-06-30 LAB
BACTERIA SPEC CULT: NO GROWTH
Lab: NORMAL
SPECIMEN SOURCE: NORMAL

## 2018-07-31 ENCOUNTER — OFFICE VISIT (OUTPATIENT)
Dept: OBGYN | Facility: CLINIC | Age: 33
End: 2018-07-31
Attending: OBSTETRICS & GYNECOLOGY
Payer: COMMERCIAL

## 2018-07-31 VITALS
BODY MASS INDEX: 21.16 KG/M2 | WEIGHT: 119.4 LBS | DIASTOLIC BLOOD PRESSURE: 62 MMHG | HEART RATE: 90 BPM | SYSTOLIC BLOOD PRESSURE: 98 MMHG

## 2018-07-31 DIAGNOSIS — Z31.69 ENCOUNTER FOR PRECONCEPTION CONSULTATION: ICD-10-CM

## 2018-07-31 DIAGNOSIS — N97.0 ANOVULATION: Primary | ICD-10-CM

## 2018-07-31 PROCEDURE — G0463 HOSPITAL OUTPT CLINIC VISIT: HCPCS | Mod: ZF

## 2018-07-31 RX ORDER — VITAMIN A, VITAMIN C, VITAMIN D-3, VITAMIN E, VITAMIN B-1, VITAMIN B-2, NIACIN, VITAMIN B-6, CALCIUM, IRON, ZINC, COPPER 4000; 120; 400; 22; 1.84; 3; 20; 10; 1; 12; 200; 27; 25; 2 [IU]/1; MG/1; [IU]/1; MG/1; MG/1; MG/1; MG/1; MG/1; MG/1; UG/1; MG/1; MG/1; MG/1; MG/1
1 TABLET ORAL DAILY
Qty: 90 TABLET | Refills: 3 | Status: SHIPPED | OUTPATIENT
Start: 2018-07-31

## 2018-07-31 RX ORDER — MEDROXYPROGESTERONE ACETATE 10 MG
10 TABLET ORAL DAILY
Qty: 10 TABLET | Refills: 3 | Status: SHIPPED | OUTPATIENT
Start: 2018-07-31

## 2018-07-31 NOTE — PROGRESS NOTES
[facility] Visit    CC: Follow up    S: 33 year old P0 here for follow up from primary infertility and previous TOA. Has history of PCOS with oligomenorrhea, TOA with recent normal HSG and  recently had semen analysis in [place] with no motility. Recently her cycles have been every 29-36 days, she did use provera while in [place] and felt like it was helping her cycle become more regular.  is in [place], she is moving there next month.  has been on vitamin/supplements per physician in [place] with plan for repeat semen analysis in 3 months.     O: BP 98/62 (BP Location: Left arm, Patient Position: Chair)  Pulse 90  Wt 54.2 kg (119 lb 6.4 oz)  LMP 07/27/2018 (Exact Date)  Breastfeeding? No  BMI 21.16 kg/m2  General: No distress    A:33 year old with primary infertility, likely mutlifactorial- male factor, anovulation, possible tubal factor as well as living apart    P: Recommend evaluation and treatment with [facility] due to multi-factorial infertility.   Rx provera given to induce withdrawal bleed prn, at least every 3 months      Total time spent was 15 minutes, over 50% spent in counseling.    Lynette Mckeon MD FACOG

## 2018-07-31 NOTE — PATIENT INSTRUCTIONS
[email or web address]    [facility] [place]    tel://[phone]    If no period by 35 days and no pregnancy, can take provera to induce period. Do this at least every 3 months if no periods.

## 2018-07-31 NOTE — NURSING NOTE
Chief Complaint   Patient presents with     Follow Up For     Follow up HSG       See NELI Trevizo 7/31/2018

## 2018-07-31 NOTE — LETTER
7/31/2018       RE: Cathie Noonan  [address]  [address]  [place] [address]     Dear Colleague,    Thank you for referring your patient, Cathie Noonan, to the [facility] at [facility]. Please see a copy of my visit note below.    [facility] Visit    CC: Follow up    S: 33 year old P0 here for follow up from primary infertility and previous TOA. Has history of PCOS with oligomenorrhea, TOA with recent normal HSG and  recently had semen analysis in [place] with no motility. Recently her cycles have been every 29-36 days, she did use provera while in [place] and felt like it was helping her cycle become more regular.  is in [place], she is moving there next month.  has been on vitamin/supplements per physician in [place] with plan for repeat semen analysis in 3 months.     O: BP 98/62 (BP Location: Left arm, Patient Position: Chair)  Pulse 90  Wt 54.2 kg (119 lb 6.4 oz)  LMP 07/27/2018 (Exact Date)  Breastfeeding? No  BMI 21.16 kg/m2  General: No distress    A:33 year old with primary infertility, likely mutlifactorial- male factor, anovulation, possible tubal factor as well as living apart    P: Recommend evaluation and treatment with [facility] due to multi-factorial infertility.   Rx provera given to induce withdrawal bleed prn, at least every 3 months      Total time spent was 15 minutes, over 50% spent in counseling.    Lynette Mckeon MD FACOG

## 2018-07-31 NOTE — MR AVS SNAPSHOT
After Visit Summary   7/31/2018    Cathie Noonan    MRN: [id number]           Patient Information     Date Of Birth          1985        Visit Information        Provider Department      7/31/2018 9:45 AM Lynette Mckeon MD [facility]        Today's Diagnoses     Anovulation    -  1      Care Instructions    [email or web address]    [facility]    tel://[phone]    If no period by 35 days and no pregnancy, can take provera to induce period. Do this at least every 3 months if no periods.           Follow-ups after your visit        Who to contact     Please call your clinic at [phone] to:    Ask questions about your health    Make or cancel appointments    Discuss your medicines    Learn about your test results    Speak to your doctor            Additional Information About Your Visit        MyChart Information     [employer] gives you secure access to your electronic health record. If you see a primary care provider, you can also send messages to your care team and make appointments. If you have questions, please call your primary care clinic.  If you do not have a primary care provider, please call [phone] and they will assist you.      [employer] is an electronic gateway that provides easy, online access to your medical records. With [employer], you can request a clinic appointment, read your test results, renew a prescription or communicate with your care team.     To access your existing account, please contact your [facility] or call [phone] for assistance.        Care EveryWhere ID     This is your Care EveryWhere ID. This could be used by other organizations to access your [place] medical records  [id number]        Your Vitals Were     Pulse Last Period Breastfeeding? BMI (Body Mass Index)          90 07/27/2018 (Exact Date) No 21.16 kg/m2         Blood Pressure  from Last 3 Encounters:   07/31/18 98/62   05/03/18 102/67   04/15/18 96/52    Weight from Last 3 Encounters:   07/31/18 54.2 kg (119 lb 6.4 oz)   05/03/18 53.5 kg (118 lb)   04/13/18 53.5 kg (118 lb)              Today, you had the following     No orders found for display         Today's Medication Changes          These changes are accurate as of 7/31/18 10:22 AM.  If you have any questions, ask your nurse or doctor.               Start taking these medicines.        Dose/Directions    medroxyPROGESTERone 10 MG tablet   Commonly known as:  PROVERA   Used for:  Anovulation   Started by:  Lynette Mckeon MD        Dose:  10 mg   Take 1 tablet (10 mg) by mouth daily   Quantity:  10 tablet   Refills:  3            Where to get your medicines      These medications were sent to [facility] [place], [place] - [address]  [address] [address]     Phone:  [phone]     medroxyPROGESTERone 10 MG tablet                Primary Care Provider Fax #    Physician No Ref-Primary [phone]       No address on file        Equal Access to Services     [facility] : Tho Dela Cruz, rosa schmid, qajessica flynn, heriberto allen . So [facility] [phone].    ATENCIÓN: Si habla español, tiene a holguin disposición servicios gratuitos de asistencia lingüística. Llame al [phone].    We comply with applicable federal civil rights laws and [place] laws. We do not discriminate on the basis of race, color, national origin, age, disability, sex, sexual orientation, or gender identity.            Thank you!     Thank you for choosing [facility]  for your care. Our goal is always to provide you with excellent care. Hearing back from our patients is one way we can continue to improve our services. Please take a few minutes to complete the written survey that you may receive in the mail after your visit with  us. Thank you!             Your Updated Medication List - Protect others around you: Learn how to safely use, store and throw away your medicines at www.disposemymeds.org.          This list is accurate as of 7/31/18 10:22 AM.  Always use your most recent med list.                   Brand Name Dispense Instructions for use Diagnosis    loratadine 10 MG tablet    CLARITIN     Take 10 mg by mouth daily as needed for allergies        medroxyPROGESTERone 10 MG tablet    PROVERA    10 tablet    Take 1 tablet (10 mg) by mouth daily    Anovulation       PRENATAL VITAMIN PLUS LOW IRON 27-1 MG Tabs     90 tablet    Take 1 tablet by mouth daily    Encounter for preconception consultation

## 2020-03-10 ENCOUNTER — HEALTH MAINTENANCE LETTER (OUTPATIENT)
Age: 35
End: 2020-03-10

## 2020-12-27 ENCOUNTER — HEALTH MAINTENANCE LETTER (OUTPATIENT)
Age: 35
End: 2020-12-27

## 2021-04-24 ENCOUNTER — HEALTH MAINTENANCE LETTER (OUTPATIENT)
Age: 36
End: 2021-04-24

## 2021-10-09 ENCOUNTER — HEALTH MAINTENANCE LETTER (OUTPATIENT)
Age: 36
End: 2021-10-09

## 2022-05-16 ENCOUNTER — HEALTH MAINTENANCE LETTER (OUTPATIENT)
Age: 37
End: 2022-05-16

## 2022-09-11 ENCOUNTER — HEALTH MAINTENANCE LETTER (OUTPATIENT)
Age: 37
End: 2022-09-11

## 2023-06-03 ENCOUNTER — HEALTH MAINTENANCE LETTER (OUTPATIENT)
Age: 38
End: 2023-06-03
